# Patient Record
Sex: MALE | Race: BLACK OR AFRICAN AMERICAN | NOT HISPANIC OR LATINO | Employment: FULL TIME | URBAN - METROPOLITAN AREA
[De-identification: names, ages, dates, MRNs, and addresses within clinical notes are randomized per-mention and may not be internally consistent; named-entity substitution may affect disease eponyms.]

---

## 2017-01-25 ENCOUNTER — TELEPHONE (OUTPATIENT)
Dept: ENDOCRINOLOGY | Facility: CLINIC | Age: 47
End: 2017-01-25

## 2017-01-25 NOTE — TELEPHONE ENCOUNTER
----- Message from Chel Borrego LPN sent at 1/11/2017  1:41 PM CST -----  Contact: Self      ----- Message -----     From: Mercy Daugherty     Sent: 1/11/2017  11:03 AM       To: Krish Hernandez Staff    Pt is calling to schedule an appt for his pituitary gland.  Pt says he has been trying to schedule an appt online since November, 2016, but has been unsuccessful.   unable to schedule appt due to exhausted schedule.    He can be reached at [phone].    Thank you.

## 2017-02-14 ENCOUNTER — LAB VISIT (OUTPATIENT)
Dept: LAB | Facility: HOSPITAL | Age: 47
End: 2017-02-14
Attending: INTERNAL MEDICINE
Payer: COMMERCIAL

## 2017-02-14 ENCOUNTER — OFFICE VISIT (OUTPATIENT)
Dept: ENDOCRINOLOGY | Facility: CLINIC | Age: 47
End: 2017-02-14
Payer: COMMERCIAL

## 2017-02-14 VITALS
SYSTOLIC BLOOD PRESSURE: 138 MMHG | DIASTOLIC BLOOD PRESSURE: 84 MMHG | WEIGHT: 253.5 LBS | HEART RATE: 82 BPM | HEIGHT: 72 IN | BODY MASS INDEX: 34.34 KG/M2

## 2017-02-14 DIAGNOSIS — E03.8 SECONDARY HYPOTHYROIDISM: ICD-10-CM

## 2017-02-14 DIAGNOSIS — E66.9 OBESITY, UNSPECIFIED OBESITY SEVERITY, UNSPECIFIED OBESITY TYPE: ICD-10-CM

## 2017-02-14 DIAGNOSIS — D35.2 PITUITARY ADENOMA: Primary | ICD-10-CM

## 2017-02-14 DIAGNOSIS — D49.7 PITUITARY TUMOR: ICD-10-CM

## 2017-02-14 DIAGNOSIS — D35.2 PITUITARY ADENOMA: ICD-10-CM

## 2017-02-14 LAB — T4 FREE SERPL-MCNC: 0.99 NG/DL

## 2017-02-14 PROCEDURE — 84439 ASSAY OF FREE THYROXINE: CPT

## 2017-02-14 PROCEDURE — 99999 PR PBB SHADOW E&M-EST. PATIENT-LVL III: CPT | Mod: PBBFAC,,, | Performed by: INTERNAL MEDICINE

## 2017-02-14 PROCEDURE — 36415 COLL VENOUS BLD VENIPUNCTURE: CPT

## 2017-02-14 PROCEDURE — 99214 OFFICE O/P EST MOD 30 MIN: CPT | Mod: S$GLB,,, | Performed by: INTERNAL MEDICINE

## 2017-02-14 NOTE — PROGRESS NOTES
"Subjective:      Patient ID: Michael Santiago is a 46 y.o. male.    Chief Complaint:  No chief complaint on file.      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  He just feels "bad"  No pain  No HA  Getting erection and has good interest in sex         Mri 10/31/14  1. History of prior pituitary neoplasm resection with some heterogeneous enhancement to the right of midline as described above which could represent residual pituitary neoplasm versus postsurgical change. Clinical correlation recommended. Follow can   be obtained to document stability.     Review of Systems   Constitutional: Positive for unexpected weight change. Negative for fatigue.        Does snore   No vika eval done    HENT: Negative for voice change.    Eyes: Positive for visual disturbance (some blurry vision ).   Respiratory: Negative for shortness of breath.    Cardiovascular: Negative for palpitations.   Gastrointestinal: Negative for abdominal pain, constipation and diarrhea.   Endocrine: Negative for cold intolerance, heat intolerance and polyuria.   Genitourinary: Negative for frequency.   Musculoskeletal: Negative for myalgias.   Skin: Negative for wound.   Neurological: Negative for dizziness and headaches.   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   All other systems reviewed and are negative.      Objective:   Physical Exam   Neck: No thyromegaly present.   Cardiovascular: Normal rate.    Pulmonary/Chest: Effort normal.   Abdominal: Soft.   Musculoskeletal: He exhibits no edema.   Vitals reviewed.    Vitals:    02/14/17 1218   BP: 138/84   Pulse: 82   Height: 6' (1.829 m)         Lab Review:   Results for orders placed or performed in visit on 08/02/16   T4, free   Result Value Ref Range    Free T4 0.72 0.71 - 1.51 ng/dL        Assessment:   # pituitary adenoma - s/p surgery  Watch for anterior pituitary hormonal deficiencies over time  Alerted as to s/s  Call if any concerns    # Sec hypothyroidism      LT4 75 mcg daily   Follow FT4, awaiting today's results         # Obesity - Body mass index is 34.38 kg/(m^2).    Unable to lose weight   check FSH/LH , testosterone   - decrease carbohydrate intake yasmine 35 gms a meal three times a day   In conjunction with exercises      Follow up:  8AM testosterone, FSH,LH   In 8/2017 with MRI and blood work 1 week prior to appointment

## 2017-02-14 NOTE — PATIENT INSTRUCTIONS
- decrease carbohydrate intake yasmine 35 gms a meal three times a day   In conjunction with exercises

## 2017-02-15 ENCOUNTER — PATIENT MESSAGE (OUTPATIENT)
Dept: ENDOCRINOLOGY | Facility: CLINIC | Age: 47
End: 2017-02-15

## 2017-02-15 RX ORDER — LEVOTHYROXINE SODIUM 75 UG/1
75 TABLET ORAL DAILY
Qty: 30 TABLET | Refills: 11 | Status: SHIPPED | OUTPATIENT
Start: 2017-02-15 | End: 2018-03-08 | Stop reason: SDUPTHER

## 2017-03-24 ENCOUNTER — CLINICAL SUPPORT (OUTPATIENT)
Dept: OPHTHALMOLOGY | Facility: CLINIC | Age: 47
End: 2017-03-24
Payer: COMMERCIAL

## 2017-03-24 ENCOUNTER — OFFICE VISIT (OUTPATIENT)
Dept: OPHTHALMOLOGY | Facility: CLINIC | Age: 47
End: 2017-03-24
Payer: COMMERCIAL

## 2017-03-24 DIAGNOSIS — D35.2 BENIGN TUMOR OF PITUITARY GLAND AND CRANIOPHARYNGEAL DUCT: ICD-10-CM

## 2017-03-24 DIAGNOSIS — H53.452: ICD-10-CM

## 2017-03-24 DIAGNOSIS — H40.011 OPEN ANGLE WITH BORDERLINE FINDINGS AND LOW GLAUCOMA RISK IN RIGHT EYE: ICD-10-CM

## 2017-03-24 DIAGNOSIS — H40.022 OPEN ANGLE WITH BORDERLINE FINDINGS AND HIGH GLAUCOMA RISK IN LEFT EYE: Primary | ICD-10-CM

## 2017-03-24 DIAGNOSIS — H40.022 OPEN ANGLE WITH BORDERLINE FINDINGS AND HIGH GLAUCOMA RISK IN LEFT EYE: ICD-10-CM

## 2017-03-24 DIAGNOSIS — H52.13 MYOPIA, BILATERAL: ICD-10-CM

## 2017-03-24 DIAGNOSIS — D35.3 BENIGN TUMOR OF PITUITARY GLAND AND CRANIOPHARYNGEAL DUCT: ICD-10-CM

## 2017-03-24 DIAGNOSIS — H57.13 EYE PAIN, BILATERAL: ICD-10-CM

## 2017-03-24 PROCEDURE — 92083 EXTENDED VISUAL FIELD XM: CPT | Mod: S$GLB,,, | Performed by: OPHTHALMOLOGY

## 2017-03-24 PROCEDURE — 92250 FUNDUS PHOTOGRAPHY W/I&R: CPT | Mod: S$GLB,,, | Performed by: OPHTHALMOLOGY

## 2017-03-24 PROCEDURE — 92014 COMPRE OPH EXAM EST PT 1/>: CPT | Mod: S$GLB,,, | Performed by: OPHTHALMOLOGY

## 2017-03-24 PROCEDURE — 99999 PR PBB SHADOW E&M-EST. PATIENT-LVL II: CPT | Mod: PBBFAC,,, | Performed by: OPHTHALMOLOGY

## 2017-03-24 PROCEDURE — 92015 DETERMINE REFRACTIVE STATE: CPT | Mod: S$GLB,,, | Performed by: OPHTHALMOLOGY

## 2017-03-24 NOTE — PROGRESS NOTES
HPI     Glaucoma    Additional comments: HVF review today           Blurred Vision    Additional comments: Pt c/o intermittent blurred vision and wouldlike new   Rx for glasses           Comments   DLS: 5/19/16    1) LTG Suspect  2) Pituitary Tumor  3) VF Defect OS  4) Dot Heme OU           Last edited by Анна Langley MA on 3/24/2017 10:03 AM. (History)            Assessment /Plan     For exam results, see Encounter Report.    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 If aggravated by the latanoprost     LTG suspect - NOT on treatment   2/2 abnl VF test (INS os ) (VF test done 2/2 H/O pit tumor)  Neg FmHx     First HVF   11/2014   First photos   12/2014   Treatment / Drops started   11/2014 // stopped 2/12/2015 - 2/2 eye pain            Family history    neg        Glaucoma meds    Latanoprost started 11/2014 - 2/12/2015        H/O adverse rxn to glaucoma drops - intol to latanoprost - eye pain         LASERS    none        GLAUCOMA SURGERIES    none        OTHER EYE SURGERIES    none        CDR    0.7 / 0/75        Tbase    10-11 / 8-9          Tmax    11/9            Ttarget    ?             HVF    4 test 2014 to  2017 - nl od // INS os        Gonio    +3 ou        CCT    480/477 (thin ou)        OCT    1 test 2015 to 2015 - RNFL - nl od // bord T os  os        HRT    1 test 2015 - to 2015 -  MR -  Dec. N/I, bord S/T od // high std dev. os /// CDR 0.79 od // high std dev.  os        Disc photos    - 2014, 2017  - OIS     - Ttoday   12/13  - Test done today    HVF / DFE / photos       2. Pit tumor    S/P resection    No associated VF loss   Patient to sees a neurologist     3. VF defect  OS   INS    4. H/O Dot heme ou    Minimal - see photos 12/2014 - no H/O diabetes - resolved    PLAN  LTG suspect on basis of VF defect os and ON exam   Stay off  - latanoprost ou q hs - no real change in IOP - ?? Effect - ?? Aggravating eye pain     HVF  Has a + INS x 4 OS (small)     Will monitor closely off gtts for now - the left eye consistently has a small INS     F/U 9  months with HRT / gonio /     If need to re-start gtts can try a BB (H/O intol to latanoprost - eye pain)     I have seen and personally examined the patient.  I agree with the findings, assessment and plan of the resident and/or fellow.     Lidia Weeks MD

## 2017-03-28 ENCOUNTER — LAB VISIT (OUTPATIENT)
Dept: LAB | Facility: HOSPITAL | Age: 47
End: 2017-03-28
Attending: INTERNAL MEDICINE
Payer: COMMERCIAL

## 2017-03-28 DIAGNOSIS — D49.7 PITUITARY TUMOR: ICD-10-CM

## 2017-03-28 DIAGNOSIS — E66.9 OBESITY, UNSPECIFIED OBESITY SEVERITY, UNSPECIFIED OBESITY TYPE: ICD-10-CM

## 2017-03-28 DIAGNOSIS — D35.2 PITUITARY ADENOMA: ICD-10-CM

## 2017-03-28 LAB
CORTIS SERPL-MCNC: 9.1 UG/DL
FSH SERPL-ACNC: 1.8 MIU/ML
LH SERPL-ACNC: 2.7 MIU/ML

## 2017-03-28 PROCEDURE — 36415 COLL VENOUS BLD VENIPUNCTURE: CPT

## 2017-03-28 PROCEDURE — 82533 TOTAL CORTISOL: CPT

## 2017-03-28 PROCEDURE — 83002 ASSAY OF GONADOTROPIN (LH): CPT

## 2017-03-28 PROCEDURE — 83001 ASSAY OF GONADOTROPIN (FSH): CPT

## 2017-03-30 ENCOUNTER — PATIENT MESSAGE (OUTPATIENT)
Dept: ENDOCRINOLOGY | Facility: CLINIC | Age: 47
End: 2017-03-30

## 2017-03-31 NOTE — TELEPHONE ENCOUNTER
AM cortisol and thyroid function appropriate   We checked to make sure you are producing enough steroids    So I advise to get repeat 8AM labs. Prior to visit in 8/2017 - orders placed inn 2/14/17

## 2017-07-17 ENCOUNTER — PATIENT MESSAGE (OUTPATIENT)
Dept: ENDOCRINOLOGY | Facility: CLINIC | Age: 47
End: 2017-07-17

## 2017-07-17 DIAGNOSIS — N52.9 ERECTILE DYSFUNCTION, UNSPECIFIED ERECTILE DYSFUNCTION TYPE: Primary | ICD-10-CM

## 2017-08-06 ENCOUNTER — PATIENT MESSAGE (OUTPATIENT)
Dept: ENDOCRINOLOGY | Facility: CLINIC | Age: 47
End: 2017-08-06

## 2017-08-06 DIAGNOSIS — E78.5 DYSLIPIDEMIA: Primary | ICD-10-CM

## 2017-08-10 NOTE — TELEPHONE ENCOUNTER
I will add lipid panel   We do not do pneumonia vaccinations, please have his PCP take care of that

## 2017-08-24 ENCOUNTER — LAB VISIT (OUTPATIENT)
Dept: LAB | Facility: HOSPITAL | Age: 47
End: 2017-08-24
Attending: INTERNAL MEDICINE
Payer: COMMERCIAL

## 2017-08-24 DIAGNOSIS — N52.9 ERECTILE DYSFUNCTION, UNSPECIFIED ERECTILE DYSFUNCTION TYPE: ICD-10-CM

## 2017-08-24 DIAGNOSIS — E66.9 OBESITY, UNSPECIFIED OBESITY SEVERITY, UNSPECIFIED OBESITY TYPE: ICD-10-CM

## 2017-08-24 DIAGNOSIS — D35.2 PITUITARY ADENOMA: ICD-10-CM

## 2017-08-24 DIAGNOSIS — E78.5 DYSLIPIDEMIA: ICD-10-CM

## 2017-08-24 DIAGNOSIS — D49.7 PITUITARY TUMOR: ICD-10-CM

## 2017-08-24 LAB
ALBUMIN SERPL BCP-MCNC: 3.6 G/DL
ALP SERPL-CCNC: 89 U/L
ALT SERPL W/O P-5'-P-CCNC: 39 U/L
ANION GAP SERPL CALC-SCNC: 7 MMOL/L
AST SERPL-CCNC: 25 U/L
BILIRUB SERPL-MCNC: 0.4 MG/DL
BUN SERPL-MCNC: 13 MG/DL
CALCIUM SERPL-MCNC: 9 MG/DL
CHLORIDE SERPL-SCNC: 111 MMOL/L
CHOLEST/HDLC SERPL: 4.3 {RATIO}
CO2 SERPL-SCNC: 24 MMOL/L
CORTIS SERPL-MCNC: 4.5 UG/DL
CREAT SERPL-MCNC: 1 MG/DL
EST. GFR  (AFRICAN AMERICAN): >60 ML/MIN/1.73 M^2
EST. GFR  (NON AFRICAN AMERICAN): >60 ML/MIN/1.73 M^2
FSH SERPL-ACNC: 1.8 MIU/ML
GLUCOSE SERPL-MCNC: 105 MG/DL
HDL/CHOLESTEROL RATIO: 23.1 %
HDLC SERPL-MCNC: 134 MG/DL
HDLC SERPL-MCNC: 31 MG/DL
LDLC SERPL CALC-MCNC: 61.6 MG/DL
LH SERPL-ACNC: 2.9 MIU/ML
NONHDLC SERPL-MCNC: 103 MG/DL
POTASSIUM SERPL-SCNC: 4.3 MMOL/L
PROLACTIN SERPL IA-MCNC: 9.3 NG/ML
PROT SERPL-MCNC: 6.9 G/DL
SODIUM SERPL-SCNC: 142 MMOL/L
T4 FREE SERPL-MCNC: 0.78 NG/DL
TESTOST SERPL-MCNC: 259 NG/DL
TRIGL SERPL-MCNC: 207 MG/DL
TSH SERPL DL<=0.005 MIU/L-ACNC: 0.84 UIU/ML

## 2017-08-24 PROCEDURE — 80053 COMPREHEN METABOLIC PANEL: CPT

## 2017-08-24 PROCEDURE — 84146 ASSAY OF PROLACTIN: CPT

## 2017-08-24 PROCEDURE — 80061 LIPID PANEL: CPT

## 2017-08-24 PROCEDURE — 82533 TOTAL CORTISOL: CPT

## 2017-08-24 PROCEDURE — 84439 ASSAY OF FREE THYROXINE: CPT

## 2017-08-24 PROCEDURE — 82024 ASSAY OF ACTH: CPT

## 2017-08-24 PROCEDURE — 84403 ASSAY OF TOTAL TESTOSTERONE: CPT

## 2017-08-24 PROCEDURE — 84443 ASSAY THYROID STIM HORMONE: CPT

## 2017-08-24 PROCEDURE — 83001 ASSAY OF GONADOTROPIN (FSH): CPT

## 2017-08-24 PROCEDURE — 36415 COLL VENOUS BLD VENIPUNCTURE: CPT

## 2017-08-24 PROCEDURE — 83002 ASSAY OF GONADOTROPIN (LH): CPT

## 2017-08-25 LAB — ACTH PLAS-MCNC: 35 PG/ML

## 2017-08-29 ENCOUNTER — TELEPHONE (OUTPATIENT)
Dept: NEUROSURGERY | Facility: CLINIC | Age: 47
End: 2017-08-29

## 2017-08-29 ENCOUNTER — OFFICE VISIT (OUTPATIENT)
Dept: ENDOCRINOLOGY | Facility: CLINIC | Age: 47
End: 2017-08-29
Payer: COMMERCIAL

## 2017-08-29 VITALS
DIASTOLIC BLOOD PRESSURE: 85 MMHG | HEIGHT: 72 IN | BODY MASS INDEX: 33.89 KG/M2 | WEIGHT: 250.25 LBS | HEART RATE: 86 BPM | SYSTOLIC BLOOD PRESSURE: 125 MMHG

## 2017-08-29 DIAGNOSIS — E03.8 SECONDARY HYPOTHYROIDISM: ICD-10-CM

## 2017-08-29 DIAGNOSIS — N52.9 ERECTILE DYSFUNCTION, UNSPECIFIED ERECTILE DYSFUNCTION TYPE: ICD-10-CM

## 2017-08-29 DIAGNOSIS — D35.2 PITUITARY ADENOMA: Primary | ICD-10-CM

## 2017-08-29 PROCEDURE — 3008F BODY MASS INDEX DOCD: CPT | Mod: S$GLB,,, | Performed by: INTERNAL MEDICINE

## 2017-08-29 PROCEDURE — 99999 PR PBB SHADOW E&M-EST. PATIENT-LVL III: CPT | Mod: PBBFAC,,, | Performed by: INTERNAL MEDICINE

## 2017-08-29 PROCEDURE — 99214 OFFICE O/P EST MOD 30 MIN: CPT | Mod: S$GLB,,, | Performed by: INTERNAL MEDICINE

## 2017-08-29 NOTE — PROGRESS NOTES
Subjective:      Patient ID: Michael Santiago is a 47 y.o. male.    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          Mri 10/31/14  1. History of prior pituitary neoplasm resection with some heterogeneous enhancement to the right of midline as described above which could represent residual pituitary neoplasm versus postsurgical change. Clinical correlation recommended. Follow can   be obtained to document stability.     Review of Systems   Constitutional: Negative for fatigue and unexpected weight change.        Does snore   No vika eval done    HENT: Negative for voice change.    Eyes: Positive for visual disturbance (some blurry vision , stable ).   Respiratory: Negative for shortness of breath.    Cardiovascular: Negative for palpitations.   Gastrointestinal: Negative for abdominal pain, constipation and diarrhea.   Endocrine: Negative for cold intolerance, heat intolerance and polyuria.   Genitourinary: Negative for frequency.   Musculoskeletal: Positive for arthralgias (left toe pain ). Negative for myalgias.   Skin: Negative for wound.   Neurological: Negative for dizziness and headaches.   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   All other systems reviewed and are negative.      Objective:   Physical Exam   Neck: No thyromegaly present.   Cardiovascular: Normal rate.    Pulmonary/Chest: Effort normal.   Abdominal: Soft.   Musculoskeletal: He exhibits no edema.   Vitals reviewed.    Vitals:    08/29/17 1137   BP: 125/85   Pulse: 86   Weight: 113.5  kg (250 lb 3.6 oz)   Height: 6' (1.829 m)         Lab Review:   Results for orders placed or performed in visit on 08/24/17   Prolactin   Result Value Ref Range    Prolactin 9.3 3.5 - 19.4 ng/mL   TSH   Result Value Ref Range    TSH 0.843 0.400 - 4.000 uIU/mL   T4, free   Result Value Ref Range    Free T4 0.78 0.71 - 1.51 ng/dL   Comprehensive metabolic panel   Result Value Ref Range    Sodium 142 136 - 145 mmol/L    Potassium 4.3 3.5 - 5.1 mmol/L    Chloride 111 (H) 95 - 110 mmol/L    CO2 24 23 - 29 mmol/L    Glucose 105 70 - 110 mg/dL    BUN, Bld 13 6 - 20 mg/dL    Creatinine 1.0 0.5 - 1.4 mg/dL    Calcium 9.0 8.7 - 10.5 mg/dL    Total Protein 6.9 6.0 - 8.4 g/dL    Albumin 3.6 3.5 - 5.2 g/dL    Total Bilirubin 0.4 0.1 - 1.0 mg/dL    Alkaline Phosphatase 89 55 - 135 U/L    AST 25 10 - 40 U/L    ALT 39 10 - 44 U/L    Anion Gap 7 (L) 8 - 16 mmol/L    eGFR if African American >60 >60 mL/min/1.73 m^2    eGFR if non African American >60 >60 mL/min/1.73 m^2   Luteinizing hormone   Result Value Ref Range    LH 2.9 0.6 - 12.1 mIU/mL   Follicle stimulating hormone   Result Value Ref Range    FSH 1.80 0.95 - 11.95 mIU/mL   Testosterone   Result Value Ref Range    Testosterone, Total 259 195.0 - 1138.0 ng/dL   ACTH   Result Value Ref Range    ACTH 35 0 - 46 pg/mL   Cortisol, 8AM   Result Value Ref Range    Cortisol -8 AM 4.5 4.30 - 22.40 ug/dL   Lipid panel   Result Value Ref Range    Cholesterol 134 120 - 199 mg/dL    Triglycerides 207 (H) 30 - 150 mg/dL    HDL 31 (L) 40 - 75 mg/dL    LDL Cholesterol 61.6 (L) 63.0 - 159.0 mg/dL    HDL/Chol Ratio 23.1 20.0 - 50.0 %    Total Cholesterol/HDL Ratio 4.3 2.0 - 5.0    Non-HDL Cholesterol 103 mg/dL        Assessment:   # pituitary adenoma - s/p surgery  Watch for anterior pituitary hormonal deficiencies over time  Alerted as to s/s  Call if any concerns    # Sec hypothyroidism      LT4 75 mcg daily   Follow FT4    # ED   Testosterone appropriate   We will get testosterone panel      # gout   Please get an appointment with urgent care      Follow up:  8AM testosterone panel - whatever day works for him   RTC in 6 months with me and  with MRI and blood work 1 week prior to appointment

## 2017-10-05 ENCOUNTER — LAB VISIT (OUTPATIENT)
Dept: LAB | Facility: HOSPITAL | Age: 47
End: 2017-10-05
Attending: INTERNAL MEDICINE
Payer: COMMERCIAL

## 2017-10-05 DIAGNOSIS — E03.8 SECONDARY HYPOTHYROIDISM: ICD-10-CM

## 2017-10-05 DIAGNOSIS — N52.9 ERECTILE DYSFUNCTION, UNSPECIFIED ERECTILE DYSFUNCTION TYPE: ICD-10-CM

## 2017-10-05 DIAGNOSIS — D35.2 PITUITARY ADENOMA: ICD-10-CM

## 2017-10-05 PROCEDURE — 36415 COLL VENOUS BLD VENIPUNCTURE: CPT

## 2017-10-05 PROCEDURE — 84270 ASSAY OF SEX HORMONE GLOBUL: CPT

## 2017-10-10 LAB
ALBUMIN SERPL-MCNC: 4.5 G/DL (ref 3.6–5.1)
SHBG SERPL-SCNC: 23 NMOL/L (ref 10–50)
TESTOST FREE SERPL-MCNC: 64.5 PG/ML (ref 46–224)
TESTOST SERPL-MCNC: 375 NG/DL (ref 250–1100)
TESTOSTERONE.FREE+WB SERPL-MCNC: 132.7 NG/DL (ref 110–575)

## 2017-12-22 ENCOUNTER — CLINICAL SUPPORT (OUTPATIENT)
Dept: OPHTHALMOLOGY | Facility: CLINIC | Age: 47
End: 2017-12-22
Payer: COMMERCIAL

## 2017-12-22 ENCOUNTER — OFFICE VISIT (OUTPATIENT)
Dept: OPHTHALMOLOGY | Facility: CLINIC | Age: 47
End: 2017-12-22
Payer: COMMERCIAL

## 2017-12-22 DIAGNOSIS — H40.011 OPEN ANGLE WITH BORDERLINE FINDINGS AND LOW GLAUCOMA RISK IN RIGHT EYE: ICD-10-CM

## 2017-12-22 DIAGNOSIS — J30.9 ALLERGIC SINUSITIS: ICD-10-CM

## 2017-12-22 DIAGNOSIS — H53.452: ICD-10-CM

## 2017-12-22 DIAGNOSIS — D35.2 BENIGN TUMOR OF PITUITARY GLAND AND CRANIOPHARYNGEAL DUCT: ICD-10-CM

## 2017-12-22 DIAGNOSIS — D35.3 BENIGN TUMOR OF PITUITARY GLAND AND CRANIOPHARYNGEAL DUCT: ICD-10-CM

## 2017-12-22 DIAGNOSIS — H57.13 EYE PAIN, BILATERAL: ICD-10-CM

## 2017-12-22 DIAGNOSIS — H40.022 OPEN ANGLE WITH BORDERLINE FINDINGS AND HIGH GLAUCOMA RISK IN LEFT EYE: ICD-10-CM

## 2017-12-22 DIAGNOSIS — H52.13 MYOPIA, BILATERAL: ICD-10-CM

## 2017-12-22 DIAGNOSIS — H40.022 OPEN ANGLE WITH BORDERLINE FINDINGS AND HIGH GLAUCOMA RISK IN LEFT EYE: Primary | ICD-10-CM

## 2017-12-22 PROCEDURE — 92012 INTRM OPH EXAM EST PATIENT: CPT | Mod: S$GLB,,, | Performed by: OPHTHALMOLOGY

## 2017-12-22 PROCEDURE — 99999 PR PBB SHADOW E&M-EST. PATIENT-LVL II: CPT | Mod: PBBFAC,,, | Performed by: OPHTHALMOLOGY

## 2017-12-22 PROCEDURE — 92134 CPTRZ OPH DX IMG PST SGM RTA: CPT | Mod: S$GLB,,, | Performed by: OPHTHALMOLOGY

## 2017-12-22 PROCEDURE — 92015 DETERMINE REFRACTIVE STATE: CPT | Mod: S$GLB,,, | Performed by: OPHTHALMOLOGY

## 2017-12-22 PROCEDURE — 92020 GONIOSCOPY: CPT | Mod: S$GLB,,, | Performed by: OPHTHALMOLOGY

## 2017-12-22 NOTE — PROGRESS NOTES
HPI     DLS: 3/24/17    Pt here for HRT review;  Pt states at times he feels like he has trash in his eyes when this   happens he uses AT's and seems to help some.     Meds: No GTTS    1. Normal tension glaucoma of both eyes, moderate stage   2. 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 Retinal dot hemorrhage, bilateral        Last edited by Laura Mireles on 12/22/2017 10:15 AM. (History)            Assessment /Plan     For exam results, see Encounter Report.    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 If aggravated by the latanoprost     LTG suspect - NOT on treatment   2/2 abnl VF test (INS os ) (VF test done 2/2 H/O pit tumor)  Neg FmHx     First HVF   11/2014   First photos   12/2014   Treatment / Drops started   11/2014 // stopped 2/12/2015 - 2/2 eye pain            Family history    neg        Glaucoma meds    Latanoprost started 11/2014 - 2/12/2015        H/O adverse rxn to glaucoma drops - intol to latanoprost - eye pain         LASERS    none        GLAUCOMA SURGERIES    none        OTHER EYE SURGERIES    none        CDR    0.7 / 0/75        Tbase    10-11 / 8-9          Tmax    11/9            Ttarget    ?             HVF    4 test 2014 to  2017 - nl od // INS os        Gonio    +3 ou        CCT    480/477 (thin ou)        OCT    1 test 2015 to 2015 - RNFL - nl od // bord T os  os        HRT    2 test 2015 - to 2017 - MR -  Dec. Dec. N/I, bord S/T od // dec. S os /// CDR 0.8 od // 0.7 os        Disc photos    - 2014, 2017  - OIS     - Ttoday   12/12  - Test done today    HRT / gonio / MR       2. Pit  tumor    S/P resection    No associated VF loss   Patient to sees a neurologist     3. VF defect OS   INS    4. H/O Dot heme ou    Minimal - see photos 12/2014 - no H/O diabetes - resolved    PLAN  LTG suspect on basis of VF defect os and ON exam   Stay off  - latanoprost ou q hs - no real change in IOP - ?? Effect - ?? Aggravating eye pain     HVF  Has a + INS x 4 OS (small)     Will monitor closely off gtts for now - the left eye consistently has a small INS     F/U 6  months with HVF / DFE / OCT     Rx glasses 12/2017 - gets a free pair every year     If need to re-start gtts can try a BB (H/O intol to latanoprost - eye pain)     I have seen and personally examined the patient.  I agree with the findings, assessment and plan of the resident and/or fellow.     Lidia Weeks MD

## 2018-03-06 NOTE — PROGRESS NOTES
Subjective:      Patient ID: Michael Santiago is a 47 y.o. male.    Chief Complaint:  Follow-up    History of Present Illness  Michael Santiago presents today for follow up of pituitary adenoma. Previous patient of Dr. Rutherford. Last seen in 08/2017. This is his first visit with me.     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 Villela.    Mri 10/31/14  1. History of prior pituitary neoplasm resection with some heterogeneous enhancement to the right of midline as described above which could represent residual pituitary neoplasm versus postsurgical change. Clinical correlation recommended. Follow can   be obtained to document stability.    Per Dr. Villela note seeing every 2 years: history of pituitary adenoma s/p resection who presents today for 1 year follow up with new MRI. The patient is two years s/p resection. MRI every 2 years.     Peripheral vision intact. Follows with optho every 6 months.   Currently pt reports no diplopia or chronic headaches.    Weight gain of ~ 2 lbs since last visit      No early morning erections  Decrease libido, still having erections though  3 kids   23 years old is the youngest one     Denies breast tenderness, gynecomastia, or nipple discharge  Denies headaches, denies loss of vision    Hypothyroidism:  On 75 mcg daily    Takes first thing in the morning 30 minutes prior to meal    current symptoms:   Denies weight gain  Denies Fatigue   Denies Constipation  Denies Hair loss  + Brittle nails  Denies Mental fog   No heat or cold intolerance.      Review of Systems   Constitutional: Negative for unexpected weight change.   Eyes: Negative for visual disturbance.   Respiratory: Negative for shortness of breath.    Cardiovascular: Negative for chest pain.   Gastrointestinal: Negative for abdominal pain.   Endocrine: Negative  for cold intolerance, heat intolerance, polydipsia, polyphagia and polyuria.   Musculoskeletal: Negative for arthralgias.   Skin: Negative for wound.   Neurological: Negative for headaches.   Hematological: Does not bruise/bleed easily.   Psychiatric/Behavioral: Negative for sleep disturbance.     Objective:   Physical Exam   Neck: No thyromegaly present.   Cardiovascular: Normal rate.    No edema present   Pulmonary/Chest: Effort normal.   Abdominal: Soft.   Vitals reviewed.    Body mass index is 34.27 kg/m².    Lab Review:   Lab Results   Component Value Date    HGBA1C 5.7 12/04/2014     Lab Results   Component Value Date    CHOL 134 08/24/2017    HDL 31 (L) 08/24/2017    LDLCALC 61.6 (L) 08/24/2017    TRIG 207 (H) 08/24/2017    CHOLHDL 23.1 08/24/2017     Lab Results   Component Value Date     08/24/2017    K 4.3 08/24/2017     (H) 08/24/2017    CO2 24 08/24/2017     08/24/2017    BUN 13 08/24/2017    CREATININE 1.0 08/24/2017    CALCIUM 9.0 08/24/2017    PROT 6.9 08/24/2017    ALBUMIN 4.5 10/05/2017    BILITOT 0.4 08/24/2017    ALKPHOS 89 08/24/2017    AST 25 08/24/2017    ALT 39 08/24/2017    ANIONGAP 7 (L) 08/24/2017    ESTGFRAFRICA >60 08/24/2017    EGFRNONAA >60 08/24/2017    TSH 0.843 08/24/2017       Assessment and Plan     1. Secondary hypothyroidism  Comprehensive metabolic panel    Prolactin    Follicle stimulating hormone    Luteinizing hormone    Hemoglobin A1c    TSH    Testosterone Panel    ACTH    Cortisol, 8AM    Lipid panel    T4, free    Comprehensive metabolic panel    ACTH    Cortisol, 8AM    Follicle stimulating hormone    Luteinizing hormone    Hemoglobin A1c    Lipid panel    TSH    Prolactin    T4, free    Testosterone Panel   2. Pituitary adenoma  Comprehensive metabolic panel    Prolactin    Follicle stimulating hormone    Luteinizing hormone    Hemoglobin A1c    TSH    Testosterone Panel    ACTH    Cortisol, 8AM    Lipid panel    Comprehensive metabolic panel    ACTH     Cortisol, 8AM    Follicle stimulating hormone    Luteinizing hormone    Hemoglobin A1c    Lipid panel    TSH    Prolactin    T4, free    Testosterone Panel     pituitary adenoma - s/p surgery  Watch for anterior pituitary hormonal deficiencies over time  Alerted as to s/s  Call if any concerns  Continue following with Dr. Villela as well.      Secondary Hypothyroidism  -- Clinically and biochemically euthyroid  -- Goal is a normal Free t4 in his case  -- Check TFTs today and adjust dosage accordingly   -- Pathophysiology of pituitary pertaining to thyroid function explained in detail  -- Avoid exogenous hyperthyroidism as this can accelerate bone loss and increase risk of CV complications.  -- Advised to take LT4 on an empty stomach with water and to wait 30-45 minutes before eating or taking other medications   -- Reviewed that symptoms of hypothyroidism may not correlate with tsh, and a normal TSH is the goal of therapy.....  symptoms are not a justification for over treatment      Follow-up in about 6 months (around 9/8/2018).  With labs prior

## 2018-03-08 ENCOUNTER — OFFICE VISIT (OUTPATIENT)
Dept: ENDOCRINOLOGY | Facility: CLINIC | Age: 48
End: 2018-03-08
Payer: COMMERCIAL

## 2018-03-08 VITALS
SYSTOLIC BLOOD PRESSURE: 128 MMHG | HEIGHT: 72 IN | HEART RATE: 78 BPM | DIASTOLIC BLOOD PRESSURE: 84 MMHG | BODY MASS INDEX: 34.22 KG/M2 | WEIGHT: 252.63 LBS

## 2018-03-08 DIAGNOSIS — D35.2 PITUITARY ADENOMA: ICD-10-CM

## 2018-03-08 DIAGNOSIS — E03.8 SECONDARY HYPOTHYROIDISM: Primary | ICD-10-CM

## 2018-03-08 PROCEDURE — 99214 OFFICE O/P EST MOD 30 MIN: CPT | Mod: S$GLB,,, | Performed by: NURSE PRACTITIONER

## 2018-03-08 PROCEDURE — 99999 PR PBB SHADOW E&M-EST. PATIENT-LVL III: CPT | Mod: PBBFAC,,, | Performed by: NURSE PRACTITIONER

## 2018-03-08 RX ORDER — LEVOTHYROXINE SODIUM 75 UG/1
75 TABLET ORAL DAILY
Qty: 90 TABLET | Refills: 3 | Status: SHIPPED | OUTPATIENT
Start: 2018-03-08 | End: 2019-02-01 | Stop reason: SDUPTHER

## 2018-03-08 RX ORDER — ALLOPURINOL 300 MG/1
300 TABLET ORAL DAILY
Qty: 90 TABLET | Refills: 3 | Status: SHIPPED | OUTPATIENT
Start: 2018-03-08 | End: 2019-03-06 | Stop reason: SDUPTHER

## 2018-03-12 ENCOUNTER — PATIENT MESSAGE (OUTPATIENT)
Dept: ENDOCRINOLOGY | Facility: CLINIC | Age: 48
End: 2018-03-12

## 2018-03-22 ENCOUNTER — LAB VISIT (OUTPATIENT)
Dept: LAB | Facility: HOSPITAL | Age: 48
End: 2018-03-22
Attending: NURSE PRACTITIONER
Payer: COMMERCIAL

## 2018-03-22 DIAGNOSIS — E03.8 SECONDARY HYPOTHYROIDISM: ICD-10-CM

## 2018-03-22 DIAGNOSIS — D35.2 PITUITARY ADENOMA: ICD-10-CM

## 2018-03-22 LAB
ALBUMIN SERPL BCP-MCNC: 4.2 G/DL
ALP SERPL-CCNC: 84 U/L
ALT SERPL W/O P-5'-P-CCNC: 36 U/L
ANION GAP SERPL CALC-SCNC: 7 MMOL/L
AST SERPL-CCNC: 25 U/L
BILIRUB SERPL-MCNC: 0.6 MG/DL
BUN SERPL-MCNC: 12 MG/DL
CALCIUM SERPL-MCNC: 9.4 MG/DL
CHLORIDE SERPL-SCNC: 105 MMOL/L
CHOLEST SERPL-MCNC: 131 MG/DL
CHOLEST/HDLC SERPL: 4.4 {RATIO}
CO2 SERPL-SCNC: 26 MMOL/L
CREAT SERPL-MCNC: 1.1 MG/DL
EST. GFR  (AFRICAN AMERICAN): >60 ML/MIN/1.73 M^2
EST. GFR  (NON AFRICAN AMERICAN): >60 ML/MIN/1.73 M^2
GLUCOSE SERPL-MCNC: 97 MG/DL
HDLC SERPL-MCNC: 30 MG/DL
HDLC SERPL: 22.9 %
LDLC SERPL CALC-MCNC: 66.4 MG/DL
NONHDLC SERPL-MCNC: 101 MG/DL
POTASSIUM SERPL-SCNC: 4.3 MMOL/L
PROT SERPL-MCNC: 7.5 G/DL
SODIUM SERPL-SCNC: 138 MMOL/L
T4 FREE SERPL-MCNC: 1 NG/DL
TRIGL SERPL-MCNC: 173 MG/DL
TSH SERPL DL<=0.005 MIU/L-ACNC: 0.82 UIU/ML

## 2018-03-22 PROCEDURE — 82024 ASSAY OF ACTH: CPT

## 2018-03-22 PROCEDURE — 83036 HEMOGLOBIN GLYCOSYLATED A1C: CPT

## 2018-03-22 PROCEDURE — 82533 TOTAL CORTISOL: CPT

## 2018-03-22 PROCEDURE — 84146 ASSAY OF PROLACTIN: CPT

## 2018-03-22 PROCEDURE — 82040 ASSAY OF SERUM ALBUMIN: CPT

## 2018-03-22 PROCEDURE — 36415 COLL VENOUS BLD VENIPUNCTURE: CPT

## 2018-03-22 PROCEDURE — 83001 ASSAY OF GONADOTROPIN (FSH): CPT

## 2018-03-22 PROCEDURE — 84443 ASSAY THYROID STIM HORMONE: CPT

## 2018-03-22 PROCEDURE — 84439 ASSAY OF FREE THYROXINE: CPT

## 2018-03-22 PROCEDURE — 80053 COMPREHEN METABOLIC PANEL: CPT

## 2018-03-22 PROCEDURE — 80061 LIPID PANEL: CPT

## 2018-03-22 PROCEDURE — 83002 ASSAY OF GONADOTROPIN (LH): CPT

## 2018-03-23 LAB
ACTH PLAS-MCNC: 38 PG/ML
CORTIS SERPL-MCNC: 5.6 UG/DL
ESTIMATED AVG GLUCOSE: 108 MG/DL
FSH SERPL-ACNC: 1.8 MIU/ML
HBA1C MFR BLD HPLC: 5.4 %
LH SERPL-ACNC: 2.5 MIU/ML
PROLACTIN SERPL IA-MCNC: 8.4 NG/ML

## 2018-03-28 LAB
ALBUMIN SERPL-MCNC: 4.7 G/DL (ref 3.6–5.1)
SHBG SERPL-SCNC: 19 NMOL/L (ref 10–50)
TESTOST FREE SERPL-MCNC: 50.8 PG/ML (ref 46–224)
TESTOST SERPL-MCNC: 273 NG/DL (ref 250–1100)
TESTOSTERONE.FREE+WB SERPL-MCNC: 108.8 NG/DL (ref 110–575)

## 2018-07-24 ENCOUNTER — PATIENT MESSAGE (OUTPATIENT)
Dept: NEUROSURGERY | Facility: CLINIC | Age: 48
End: 2018-07-24

## 2018-07-24 ENCOUNTER — TELEPHONE (OUTPATIENT)
Dept: NEUROSURGERY | Facility: CLINIC | Age: 48
End: 2018-07-24

## 2018-07-24 DIAGNOSIS — Z86.018 HISTORY OF PITUITARY ADENOMA: Primary | ICD-10-CM

## 2018-07-24 DIAGNOSIS — E89.3 STATUS POST TRANSSPHENOIDAL PITUITARY RESECTION: ICD-10-CM

## 2018-08-28 ENCOUNTER — HOSPITAL ENCOUNTER (OUTPATIENT)
Dept: RADIOLOGY | Facility: HOSPITAL | Age: 48
Discharge: HOME | End: 2018-08-28
Attending: NEUROLOGICAL SURGERY
Payer: COMMERCIAL

## 2018-08-28 ENCOUNTER — OFFICE VISIT (OUTPATIENT)
Dept: NEUROSURGERY | Facility: CLINIC | Age: 48
End: 2018-08-28
Payer: COMMERCIAL

## 2018-08-28 VITALS
HEART RATE: 82 BPM | TEMPERATURE: 98 F | WEIGHT: 242.75 LBS | DIASTOLIC BLOOD PRESSURE: 75 MMHG | SYSTOLIC BLOOD PRESSURE: 129 MMHG | BODY MASS INDEX: 32.92 KG/M2

## 2018-08-28 DIAGNOSIS — D49.7 PITUITARY TUMOR: ICD-10-CM

## 2018-08-28 DIAGNOSIS — Z86.018 HISTORY OF PITUITARY ADENOMA: ICD-10-CM

## 2018-08-28 DIAGNOSIS — E89.3 STATUS POST TRANSSPHENOIDAL PITUITARY RESECTION: ICD-10-CM

## 2018-08-28 DIAGNOSIS — Z86.018 HISTORY OF PITUITARY ADENOMA: Primary | ICD-10-CM

## 2018-08-28 PROCEDURE — 70553 MRI BRAIN STEM W/O & W/DYE: CPT | Mod: 26,,, | Performed by: RADIOLOGY

## 2018-08-28 PROCEDURE — 99214 OFFICE O/P EST MOD 30 MIN: CPT | Mod: S$GLB,,, | Performed by: NEUROLOGICAL SURGERY

## 2018-08-28 PROCEDURE — 25500020 PHARM REV CODE 255: Performed by: NEUROLOGICAL SURGERY

## 2018-08-28 PROCEDURE — 3008F BODY MASS INDEX DOCD: CPT | Mod: CPTII,S$GLB,, | Performed by: NEUROLOGICAL SURGERY

## 2018-08-28 PROCEDURE — A9585 GADOBUTROL INJECTION: HCPCS | Performed by: NEUROLOGICAL SURGERY

## 2018-08-28 PROCEDURE — 70553 MRI BRAIN STEM W/O & W/DYE: CPT | Mod: TC

## 2018-08-28 PROCEDURE — 99999 PR PBB SHADOW E&M-EST. PATIENT-LVL III: CPT | Mod: PBBFAC,,, | Performed by: NEUROLOGICAL SURGERY

## 2018-08-28 RX ORDER — GADOBUTROL 604.72 MG/ML
5 INJECTION INTRAVENOUS
Status: COMPLETED | OUTPATIENT
Start: 2018-08-28 | End: 2018-08-28

## 2018-08-28 RX ADMIN — GADOBUTROL 5 ML: 604.72 INJECTION INTRAVENOUS at 11:08

## 2018-08-28 NOTE — PATIENT INSTRUCTIONS
I have personally reviewed the MRI brain  with the pt which shows a hypoenhancing mass in the right side of the pituitary gland with mild mass effect when compared to MRI brain from 08/02/2016, and no evidence of compression.    I will schedule the patient for a 2 year follow up with MRI brain W WO Contrast

## 2018-08-28 NOTE — PROGRESS NOTES
Subjective:   I, Florence Young, attest that this documentation has been prepared under the direction and in the presence of Charlie Villela MD.     Patient ID: Michael Santiago is a 48 y.o. male     Chief Complaint: Follow-up      HPI  The patient is a 48 y.o. male with pituitary adenoma, s/p transphenoidal hypophysectomy of tumor on 06/30/2014, who presents today for 2 year follow up with MRI brain. I last saw the pt on 08/02/2016, at which time he was essentially asymptomatic and MRI brain (dated 08/02/2016) showed a stable small focus of hypoenhancing tissue at site of large pituitary macroadenoma resection, and a normal gland seen without evidence of any residual tumor.  Today, the pt states he is doing well with no complaints at this time. He continues to work with no difficulty or changes in his energy levels..    Review of Systems   Constitutional: Negative for activity change, appetite change, fatigue, fever and unexpected weight change.   HENT: Negative for facial swelling.    Eyes: Negative.    Respiratory: Negative.    Cardiovascular: Negative.    Gastrointestinal: Negative for diarrhea, nausea and vomiting.   Endocrine: Negative.    Genitourinary: Negative.    Musculoskeletal: Negative for back pain, joint swelling, myalgias and neck pain.   Neurological: Negative for dizziness, weakness, numbness and headaches.   Psychiatric/Behavioral: Negative.       Past Medical History:   Diagnosis Date    Gout     Headache(784.0)     Hypothyroidism     Normal tension glaucoma of both eyes 11/18/2014    Obesity 7/29/2014    Pituitary adenoma        Objective:      Vitals:    08/28/18 1255   BP: 129/75   Pulse: 82   Temp: 98 °F (36.7 °C)      Physical Exam   Constitutional: He is oriented to person, place, and time. He appears well-nourished.   HENT:   Head: Normocephalic and atraumatic.   Neck: Neck supple.   Neurological: He is alert and oriented to person, place, and time. No cranial nerve deficit. He displays a  negative Romberg sign. GCS eye subscore is 4. GCS verbal subscore is 5. GCS motor subscore is 6.       IMAGING:  MRI Brain W WO Contrast (08/28/2018) shows a hypoenhancing mass in the right side of the pituitary gland with mild mass effect when compared to MRI brain from 08/02/2016,  but no evidence of compression.    I have personally reviewed the images with the pt.      I, Dr. Charlie Villela, personally performed the services described in this documentation. All medical record entries made by the scribe, Florence Young, were at my direction and in my presence.  I have reviewed the chart and agree that the record reflects my personal performance and is accurate and complete. Charlie Villela MD. 08/28/2018    Assessment:       Pituitary tumor.     Plan:   I have personally reviewed the MRI brain  with the pt which shows a hypoenhancing mass in the right side of the pituitary gland with mild mass effect when compared to MRI brain from 08/02/2016, and no evidence of compression.    I will schedule the patient for a 2 year follow up with MRI brain W WO Contrast

## 2018-08-31 ENCOUNTER — CLINICAL SUPPORT (OUTPATIENT)
Dept: OPHTHALMOLOGY | Facility: CLINIC | Age: 48
End: 2018-08-31
Attending: OPHTHALMOLOGY
Payer: COMMERCIAL

## 2018-08-31 DIAGNOSIS — D35.3 BENIGN TUMOR OF PITUITARY GLAND AND CRANIOPHARYNGEAL DUCT: ICD-10-CM

## 2018-08-31 DIAGNOSIS — D35.2 BENIGN TUMOR OF PITUITARY GLAND AND CRANIOPHARYNGEAL DUCT: ICD-10-CM

## 2018-08-31 DIAGNOSIS — H52.13 MYOPIA, BILATERAL: ICD-10-CM

## 2018-08-31 DIAGNOSIS — H40.011 OPEN ANGLE WITH BORDERLINE FINDINGS AND LOW GLAUCOMA RISK IN RIGHT EYE: ICD-10-CM

## 2018-08-31 DIAGNOSIS — H53.452: ICD-10-CM

## 2018-08-31 DIAGNOSIS — H40.022 OPEN ANGLE WITH BORDERLINE FINDINGS AND HIGH GLAUCOMA RISK IN LEFT EYE: Primary | ICD-10-CM

## 2018-08-31 DIAGNOSIS — H40.022 OPEN ANGLE WITH BORDERLINE FINDINGS AND HIGH GLAUCOMA RISK IN LEFT EYE: ICD-10-CM

## 2018-08-31 PROCEDURE — 99999 PR PBB SHADOW E&M-EST. PATIENT-LVL II: CPT | Mod: PBBFAC,,, | Performed by: OPHTHALMOLOGY

## 2018-08-31 PROCEDURE — 92133 CPTRZD OPH DX IMG PST SGM ON: CPT | Mod: S$GLB,,, | Performed by: OPHTHALMOLOGY

## 2018-08-31 PROCEDURE — 92083 EXTENDED VISUAL FIELD XM: CPT | Mod: S$GLB,,, | Performed by: OPHTHALMOLOGY

## 2018-08-31 PROCEDURE — 92014 COMPRE OPH EXAM EST PT 1/>: CPT | Mod: S$GLB,,, | Performed by: OPHTHALMOLOGY

## 2018-08-31 NOTE — PROGRESS NOTES
HPI     Glaucoma      Additional comments: HVF and OCT review today              Comments     DLS: 5/19/16    1) LTG Suspect  2) Pituitary Tumor  3) VF Defect OS  4) Dot Heme OU              Last edited by Анна Langley MA on 8/31/2018 11:39 AM. (History)            Assessment /Plan     For exam results, see Encounter Report.    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 If aggravated by the latanoprost     LTG suspect - NOT on treatment   2/2 abnl VF test (INS os ) (VF test done 2/2 H/O pit tumor)  Neg FmHx     First HVF   11/2014   First photos   12/2014   Treatment / Drops started   11/2014 // stopped 2/12/2015 - 2/2 eye pain            Family history    neg        Glaucoma meds    Latanoprost started 11/2014 - 2/12/2015        H/O adverse rxn to glaucoma drops - intol to latanoprost - eye pain         LASERS    none        GLAUCOMA SURGERIES    none        OTHER EYE SURGERIES    none        CDR    0.7 / 0/75        Tbase    10-11 / 8-9          Tmax    11/9            Ttarget    ?             HVF    5 test 2014 to  2017 - nl od // INS os        Gonio    +3 ou        CCT    480/477 (thin ou)        OCT    2 test 2015 to 2018 - RNFL - dec T od // dec. G/TS/T/TI  os        HRT    2 test 2015 - to 2017 - MR -  Dec. Dec. N/I, bord S/T od // dec. S os /// CDR 0.8 od // 0.7 os        Disc photos    - 2014, 2017  - OIS     - Ttoday   10/9  - Test done today    HVF / DFE / OCT       2. Pit tumor    S/P resection    No associated  VF loss   Patient to sees a neurologist     3. VF defect OS   INS    4. H/O Dot heme ou    Minimal - see photos 12/2014 - no H/O diabetes - resolved    PLAN  LTG suspect on basis of VF defect os and ON exam   Stay off  - latanoprost ou q hs - no real change in IOP - ?? Effect - ?? Aggravating eye pain     HVF  Has a + INS x 4 OS (small)     Will monitor closely off gtts for now - the left eye consistently has a small INS     F/U 6  months with HRT / gonio     Rx glasses 8/2018 - gets a free pair every year     If need to re-start gtts can try a BB (H/O intol to latanoprost - eye pain)     I have seen and personally examined the patient.  I agree with the findings, assessment and plan of the resident and/or fellow.     Lidia Weeks MD

## 2019-02-01 RX ORDER — LEVOTHYROXINE SODIUM 75 UG/1
TABLET ORAL
Qty: 90 TABLET | Refills: 3 | Status: SHIPPED | OUTPATIENT
Start: 2019-02-01 | End: 2019-03-05 | Stop reason: SDUPTHER

## 2019-03-05 ENCOUNTER — PATIENT MESSAGE (OUTPATIENT)
Dept: ENDOCRINOLOGY | Facility: CLINIC | Age: 49
End: 2019-03-05

## 2019-03-06 RX ORDER — LEVOTHYROXINE SODIUM 75 UG/1
75 TABLET ORAL DAILY
Qty: 90 TABLET | Refills: 3 | Status: SHIPPED | OUTPATIENT
Start: 2019-03-06 | End: 2019-03-06 | Stop reason: SDUPTHER

## 2019-03-07 RX ORDER — LEVOTHYROXINE SODIUM 75 UG/1
75 TABLET ORAL DAILY
Qty: 90 TABLET | Refills: 3 | Status: SHIPPED | OUTPATIENT
Start: 2019-03-07 | End: 2020-03-20 | Stop reason: SDUPTHER

## 2019-03-07 RX ORDER — ALLOPURINOL 300 MG/1
300 TABLET ORAL DAILY
Qty: 90 TABLET | Refills: 3 | Status: SHIPPED | OUTPATIENT
Start: 2019-03-07 | End: 2020-03-20 | Stop reason: SDUPTHER

## 2020-03-20 ENCOUNTER — PATIENT MESSAGE (OUTPATIENT)
Dept: ENDOCRINOLOGY | Facility: CLINIC | Age: 50
End: 2020-03-20

## 2020-03-20 RX ORDER — ALLOPURINOL 300 MG/1
300 TABLET ORAL DAILY
Qty: 90 TABLET | Refills: 0 | Status: SHIPPED | OUTPATIENT
Start: 2020-03-20 | End: 2020-06-20 | Stop reason: SDUPTHER

## 2020-03-20 RX ORDER — LEVOTHYROXINE SODIUM 75 UG/1
75 TABLET ORAL DAILY
Qty: 90 TABLET | Refills: 0 | Status: SHIPPED | OUTPATIENT
Start: 2020-03-20 | End: 2020-06-20 | Stop reason: SDUPTHER

## 2020-05-15 ENCOUNTER — TELEPHONE (OUTPATIENT)
Dept: ENDOCRINOLOGY | Facility: CLINIC | Age: 50
End: 2020-05-15

## 2020-05-15 ENCOUNTER — TELEPHONE (OUTPATIENT)
Dept: NEUROSURGERY | Facility: CLINIC | Age: 50
End: 2020-05-15

## 2020-05-15 DIAGNOSIS — D49.7 PITUITARY TUMOR: ICD-10-CM

## 2020-05-15 DIAGNOSIS — Z86.018 HISTORY OF PITUITARY ADENOMA: ICD-10-CM

## 2020-05-15 DIAGNOSIS — E89.3 STATUS POST TRANSSPHENOIDAL PITUITARY RESECTION: ICD-10-CM

## 2020-05-15 DIAGNOSIS — E03.8 SECONDARY HYPOTHYROIDISM: Primary | ICD-10-CM

## 2020-05-15 DIAGNOSIS — D49.7 PITUITARY TUMOR: Primary | ICD-10-CM

## 2020-05-20 DIAGNOSIS — D49.7 PITUITARY TUMOR: Primary | ICD-10-CM

## 2020-05-20 DIAGNOSIS — E66.9 OBESITY, UNSPECIFIED CLASSIFICATION, UNSPECIFIED OBESITY TYPE, UNSPECIFIED WHETHER SERIOUS COMORBIDITY PRESENT: ICD-10-CM

## 2020-05-20 NOTE — PROGRESS NOTES
Orders for next visit.      1. Pituitary tumor  - ACTH; Future  - Cortisol, 8AM; Future  - Insulin-like growth factor; Future  - Prolactin; Future  - T4, free; Future  - Testosterone; Future  - Comprehensive metabolic panel; Future    2. Obesity, unspecified classification, unspecified obesity type, unspecified whether serious comorbidity present  - Hemoglobin A1C; Future  - Lipid Panel; Future    Alice Verduzco MD

## 2020-08-03 ENCOUNTER — LAB VISIT (OUTPATIENT)
Dept: LAB | Facility: HOSPITAL | Age: 50
End: 2020-08-03
Attending: INTERNAL MEDICINE
Payer: COMMERCIAL

## 2020-08-03 DIAGNOSIS — E66.9 OBESITY, UNSPECIFIED CLASSIFICATION, UNSPECIFIED OBESITY TYPE, UNSPECIFIED WHETHER SERIOUS COMORBIDITY PRESENT: ICD-10-CM

## 2020-08-03 DIAGNOSIS — D49.7 PITUITARY TUMOR: ICD-10-CM

## 2020-08-03 LAB
ALBUMIN SERPL BCP-MCNC: 4.3 G/DL (ref 3.5–5.2)
ALP SERPL-CCNC: 69 U/L (ref 38–126)
ALT SERPL W/O P-5'-P-CCNC: 36 U/L (ref 10–44)
ANION GAP SERPL CALC-SCNC: 5 MMOL/L (ref 8–16)
AST SERPL-CCNC: 32 U/L (ref 15–46)
BILIRUB SERPL-MCNC: 0.5 MG/DL (ref 0.1–1)
BUN SERPL-MCNC: 19 MG/DL (ref 2–20)
CALCIUM SERPL-MCNC: 9.6 MG/DL (ref 8.7–10.5)
CHLORIDE SERPL-SCNC: 107 MMOL/L (ref 95–110)
CHOLEST SERPL-MCNC: 131 MG/DL (ref 120–199)
CHOLEST/HDLC SERPL: 3.3 {RATIO} (ref 2–5)
CO2 SERPL-SCNC: 28 MMOL/L (ref 23–29)
CREAT SERPL-MCNC: 1.11 MG/DL (ref 0.5–1.4)
EST. GFR  (AFRICAN AMERICAN): >60 ML/MIN/1.73 M^2
EST. GFR  (NON AFRICAN AMERICAN): >60 ML/MIN/1.73 M^2
ESTIMATED AVG GLUCOSE: 103 MG/DL (ref 68–131)
GLUCOSE SERPL-MCNC: 96 MG/DL (ref 70–110)
HBA1C MFR BLD HPLC: 5.2 % (ref 4–5.6)
HDLC SERPL-MCNC: 40 MG/DL (ref 40–75)
HDLC SERPL: 30.5 % (ref 20–50)
LDLC SERPL CALC-MCNC: 43 MG/DL (ref 63–159)
NONHDLC SERPL-MCNC: 91 MG/DL
POTASSIUM SERPL-SCNC: 4.8 MMOL/L (ref 3.5–5.1)
PROT SERPL-MCNC: 7.4 G/DL (ref 6–8.4)
SODIUM SERPL-SCNC: 140 MMOL/L (ref 136–145)
T4 FREE SERPL-MCNC: 0.89 NG/DL (ref 0.71–1.51)
TESTOST SERPL-MCNC: 397 NG/DL (ref 304–1227)
TRIGL SERPL-MCNC: 240 MG/DL (ref 30–150)

## 2020-08-03 PROCEDURE — 36415 COLL VENOUS BLD VENIPUNCTURE: CPT | Mod: PO

## 2020-08-03 PROCEDURE — 82024 ASSAY OF ACTH: CPT | Mod: PO

## 2020-08-03 PROCEDURE — 84146 ASSAY OF PROLACTIN: CPT | Mod: PO

## 2020-08-03 PROCEDURE — 84439 ASSAY OF FREE THYROXINE: CPT

## 2020-08-03 PROCEDURE — 80053 COMPREHEN METABOLIC PANEL: CPT | Mod: PO

## 2020-08-03 PROCEDURE — 82533 TOTAL CORTISOL: CPT | Mod: PO

## 2020-08-03 PROCEDURE — 84403 ASSAY OF TOTAL TESTOSTERONE: CPT | Mod: PO

## 2020-08-03 PROCEDURE — 83036 HEMOGLOBIN GLYCOSYLATED A1C: CPT

## 2020-08-03 PROCEDURE — 84305 ASSAY OF SOMATOMEDIN: CPT | Mod: PO

## 2020-08-03 PROCEDURE — 80061 LIPID PANEL: CPT

## 2020-08-04 LAB
ACTH PLAS-MCNC: 31 PG/ML (ref 0–46)
CORTIS SERPL-MCNC: 10.7 UG/DL (ref 4.3–22.4)
PROLACTIN SERPL IA-MCNC: 9.6 NG/ML (ref 3.5–19.4)

## 2020-08-06 ENCOUNTER — TELEPHONE (OUTPATIENT)
Dept: NEUROSURGERY | Facility: CLINIC | Age: 50
End: 2020-08-06

## 2020-08-06 LAB
IGF-I SERPL-MCNC: 177 NG/ML (ref 40–259)
IGF-I Z-SCORE SERPL: 1.04 SD

## 2020-08-11 ENCOUNTER — OFFICE VISIT (OUTPATIENT)
Dept: ENDOCRINOLOGY | Facility: CLINIC | Age: 50
End: 2020-08-11
Payer: COMMERCIAL

## 2020-08-11 ENCOUNTER — HOSPITAL ENCOUNTER (OUTPATIENT)
Dept: RADIOLOGY | Facility: HOSPITAL | Age: 50
Discharge: HOME | End: 2020-08-11
Attending: NEUROLOGICAL SURGERY
Payer: COMMERCIAL

## 2020-08-11 VITALS
SYSTOLIC BLOOD PRESSURE: 120 MMHG | HEART RATE: 72 BPM | DIASTOLIC BLOOD PRESSURE: 80 MMHG | BODY MASS INDEX: 31.18 KG/M2 | HEIGHT: 72 IN | WEIGHT: 230.19 LBS

## 2020-08-11 DIAGNOSIS — D49.7 PITUITARY TUMOR: ICD-10-CM

## 2020-08-11 DIAGNOSIS — Z86.018 HISTORY OF PITUITARY ADENOMA: ICD-10-CM

## 2020-08-11 DIAGNOSIS — N52.9 ERECTILE DYSFUNCTION, UNSPECIFIED ERECTILE DYSFUNCTION TYPE: ICD-10-CM

## 2020-08-11 DIAGNOSIS — E89.3 STATUS POST TRANSSPHENOIDAL PITUITARY RESECTION: ICD-10-CM

## 2020-08-11 DIAGNOSIS — D35.2 PITUITARY ADENOMA: ICD-10-CM

## 2020-08-11 DIAGNOSIS — E03.8 SECONDARY HYPOTHYROIDISM: ICD-10-CM

## 2020-08-11 DIAGNOSIS — D49.7 PITUITARY TUMOR: Primary | ICD-10-CM

## 2020-08-11 PROCEDURE — 99999 PR PBB SHADOW E&M-EST. PATIENT-LVL III: CPT | Mod: PBBFAC,,, | Performed by: INTERNAL MEDICINE

## 2020-08-11 PROCEDURE — A9585 GADOBUTROL INJECTION: HCPCS | Performed by: NEUROLOGICAL SURGERY

## 2020-08-11 PROCEDURE — 3008F PR BODY MASS INDEX (BMI) DOCUMENTED: ICD-10-PCS | Mod: CPTII,S$GLB,, | Performed by: INTERNAL MEDICINE

## 2020-08-11 PROCEDURE — 99214 PR OFFICE/OUTPT VISIT, EST, LEVL IV, 30-39 MIN: ICD-10-PCS | Mod: S$GLB,,, | Performed by: INTERNAL MEDICINE

## 2020-08-11 PROCEDURE — 99214 OFFICE O/P EST MOD 30 MIN: CPT | Mod: S$GLB,,, | Performed by: INTERNAL MEDICINE

## 2020-08-11 PROCEDURE — 70553 MRI PITUITARY W W/O CONTRAST: ICD-10-PCS | Mod: 26,,, | Performed by: RADIOLOGY

## 2020-08-11 PROCEDURE — 99999 PR PBB SHADOW E&M-EST. PATIENT-LVL III: ICD-10-PCS | Mod: PBBFAC,,, | Performed by: INTERNAL MEDICINE

## 2020-08-11 PROCEDURE — 70553 MRI BRAIN STEM W/O & W/DYE: CPT | Mod: 26,,, | Performed by: RADIOLOGY

## 2020-08-11 PROCEDURE — 3008F BODY MASS INDEX DOCD: CPT | Mod: CPTII,S$GLB,, | Performed by: INTERNAL MEDICINE

## 2020-08-11 PROCEDURE — 70553 MRI BRAIN STEM W/O & W/DYE: CPT | Mod: TC

## 2020-08-11 PROCEDURE — 25500020 PHARM REV CODE 255: Performed by: NEUROLOGICAL SURGERY

## 2020-08-11 RX ORDER — GADOBUTROL 604.72 MG/ML
5 INJECTION INTRAVENOUS
Status: COMPLETED | OUTPATIENT
Start: 2020-08-11 | End: 2020-08-11

## 2020-08-11 RX ADMIN — GADOBUTROL 5 ML: 604.72 INJECTION INTRAVENOUS at 09:08

## 2020-08-11 NOTE — ASSESSMENT & PLAN NOTE
Secondary Hypothyroidism  - Clinically and biochemically euthyroid  - Patient on synthroid 75 mcg PO daily  - Goal is a normal Free T4 in his case (Some literature recommend values in the mid to upper levels of normal)  - Pathophysiology of pituitary pertaining to thyroid function explained in detail     Plan  -Check TFT's in a year and dosage accordingly   -Continue Synthroid 75 mcg PO daily  -Avoid exogenous hyperthyroidism as this can accelerate bone loss and increase risk of CV complications  -Advised to take LT4 on an empty stomach with water and to wait 30-45 minutes before eating or taking other medications

## 2020-08-11 NOTE — ASSESSMENT & PLAN NOTE
Pituitary adenoma - s/p surgery  MRI 8/11/20 shows mild increase around 2mm in size in comparison to MRI from 7/24/18  No anterior pituitary hormonal deficiencies based on current labs  No compressive symptoms reported  Continue following with Dr. Villela as well.     Plan  Will repeat pituitary labs in 1 year  Patient is to Follow up with Neurosurgery

## 2020-08-11 NOTE — ASSESSMENT & PLAN NOTE
Patient denies morning erections  Does have erections but feels they are not as strong as before  Normal total testosterone levels    Plan  Will order testosterone panel  If normal testosterone panel patient advised to follow up with PCP for other causes of erectile dysfunction and possible urology follow up

## 2020-08-11 NOTE — PROGRESS NOTES
I have reviewed and concur with Dr. Henao's history, physical, assessment, and plan.  I have personally interviewed and examined the patient.  See below addendum for my evaluation and additional findings.    I have personally reviewed the MRI from earlier today which shows a persistent right-sided pituitary adenoma that appears to have grown by about 2 mm over the past 2 years.  There may be invasion into the right cavernous sinus however there is no encroachment upon the optic apparatus.  Normal pituitary shifted to the left side of the sella.  His total testosterone is normal however he has complaints of erectile dysfunction, will check testosterone panel and if normal as expected, recommend that he follows up with Urology.  No strong evidence to support secondary hypothyroidism however TSH is normal on current replacement with normal free T4 so will continue levothyroxine.    He will have a follow-up visit with Dr. Villela to discuss MRI results.  It would be reasonable to continue to monitor for continued size increase of adenoma with serial imaging or proceed with repeat resection vs. radiation.    Alice Verduzco MD

## 2020-08-11 NOTE — PROGRESS NOTES
Pituitary Clinic Follow Up    2020    Last seen by NP 2 years ago, first visit w/ me today    Chief Complaint:  No chief complaint on file.    History of Present Illness  Michael Santiago  Is a 50 y.o. man who is s/p TSR 14 with Dr. 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  preop eval indicated non-functional adenoma, path report w/o staining reports only adenoma.  He  Does have some residual tumor in the right side of the sella, being followed w/ MRI every 2 years.      Initial presentation: to ent dizziness ha and neck pain   Found to have a macroadenoma - nonfunctional    Peripheral vision intact. Follows with optho every 6 months.   Currently pt reports no diplopia or chronic headaches.    Imagin18  MRI - hypoenhancing mass in right side of the pituitary gland measuring 11 x 8 by 10 mm.  This has increased in size slightly by around 2 mm compared with the prior study 2016.  There is minimal displacement of the optic chiasm insertion to the left.      Mri 10/31/14  1. History of prior pituitary neoplasm resection with some heterogeneous enhancement to the right of midline as described above which could represent residual pituitary neoplasm versus postsurgical change. Clinical correlation recommended. Follow can   be obtained to document stability.      Lost weight by preference (by healthy eating habits, 20 pounds in the last 2 years)    No early morning erections  Decrease libido more than last time, still having erections though (but per patient not a full erection)  3 kids  23 years old is the youngest one     Denies breast tenderness, gynecomastia, or nipple discharge  Denies headaches, denies loss of vision    Hypothyroidism:  On 75 mcg daily    Takes first thing in the morning 30 minutes prior to meal    He is clinically euthyroid, no palpitations, no irritability, normal energy level     Ref. Range 2017 12:16 3/28/2017 09:00  8/24/2017 09:06 3/22/2018 09:44 8/3/2020 09:04   Cortisol -8 AM Latest Ref Range: 4.30 - 22.40 ug/dL  9.1 4.5 5.6 10.70   Hemoglobin A1C External Latest Ref Range: 4.0 - 5.6 %    5.4 5.2   Estimated Avg Glucose Latest Ref Range: 68 - 131 mg/dL    108 103   ACTH Latest Ref Range: 0 - 46 pg/mL   35 38 31   Somatomedin (IGF-I) Latest Ref Range: 40 - 259 ng/mL     177   TSH Latest Ref Range: 0.400 - 4.000 uIU/mL   0.843 0.824    Free T4 Latest Ref Range: 0.71 - 1.51 ng/dL 0.99  0.78 1.00 0.89        Review of Systems   Constitutional: Negative for unexpected weight change.   Eyes: Negative for visual disturbance.   Respiratory: Negative for shortness of breath.    Cardiovascular: Negative for chest pain.   Gastrointestinal: Negative for abdominal pain.   Endocrine: Negative for cold intolerance, heat intolerance, polydipsia, polyphagia and polyuria.   Musculoskeletal: Negative for arthralgias.   Skin: Negative for wound.   Neurological: Negative for headaches.   Hematological: Does not bruise/bleed easily.   Psychiatric/Behavioral: Negative for sleep disturbance.     Objective:   Body mass index is 31.22 kg/m².  Constitutional:  Pleasant,  in no acute distress.   HENT:   Eyes:     No scleral icterus.   Respiratory:   Effort normal   Neurological:  normal speech  Psych:   Normal mood and affect.      Lab Review:   Lab Results   Component Value Date    HGBA1C 5.2 08/03/2020     Lab Results   Component Value Date    CHOL 131 08/03/2020    HDL 40 08/03/2020    LDLCALC 43.0 (L) 08/03/2020    TRIG 240 (H) 08/03/2020    CHOLHDL 30.5 08/03/2020     Lab Results   Component Value Date     08/03/2020    K 4.8 08/03/2020     08/03/2020    CO2 28 08/03/2020    GLU 96 08/03/2020    BUN 19 08/03/2020    CREATININE 1.11 08/03/2020    CALCIUM 9.6 08/03/2020    PROT 7.4 08/03/2020    ALBUMIN 4.3 08/03/2020    BILITOT 0.5 08/03/2020    ALKPHOS 69 08/03/2020    AST 32 08/03/2020    ALT 36 08/03/2020    ANIONGAP 5 (L) 08/03/2020     ESTGFRAFRICA >60.0 08/03/2020    EGFRNONAA >60.0 08/03/2020    TSH 0.824 03/22/2018       Assessment and Plan       Problem List Items Addressed This Visit        Renal/    Erectile dysfunction     Patient denies morning erections  Does have erections but feels they are not as strong as before  Normal total testosterone levels    Plan  Will order testosterone panel  If normal testosterone panel patient advised to follow up with PCP for other causes of erectile dysfunction and possible urology follow up            Oncology    Pituitary adenoma     Pituitary adenoma - s/p surgery  MRI 8/11/20 shows mild increase around 2mm in size in comparison to MRI from 7/24/18  No anterior pituitary hormonal deficiencies based on current labs  No compressive symptoms reported  Continue following with Dr. Villela as well.     Plan  Will repeat pituitary labs in 1 year  Patient is to Follow up with Neurosurgery         Pituitary tumor - Primary    Relevant Orders    ACTH    Cortisol, 8AM    Insulin-like growth factor    Prolactin    T4, free    Testosterone    TSH    CBC auto differential    Testosterone Panel       Endocrine    Secondary hypothyroidism     Secondary Hypothyroidism  - Clinically and biochemically euthyroid  - Patient on synthroid 75 mcg PO daily  - Goal is a normal Free T4 in his case (Some literature recommend values in the mid to upper levels of normal)  - Pathophysiology of pituitary pertaining to thyroid function explained in detail     Plan  -Check TFT's in a year and dosage accordingly   -Continue Synthroid 75 mcg PO daily  -Avoid exogenous hyperthyroidism as this can accelerate bone loss and increase risk of CV complications  -Advised to take LT4 on an empty stomach with water and to wait 30-45 minutes before eating or taking other medications              RTC in 1 year w/ 8 am labs prior     Magi Henao MD

## 2020-09-01 ENCOUNTER — TELEPHONE (OUTPATIENT)
Dept: NEUROSURGERY | Facility: CLINIC | Age: 50
End: 2020-09-01

## 2020-09-21 DIAGNOSIS — E03.8 SECONDARY HYPOTHYROIDISM: Primary | ICD-10-CM

## 2020-09-21 RX ORDER — LEVOTHYROXINE SODIUM 75 UG/1
75 TABLET ORAL DAILY
Qty: 90 TABLET | Refills: 0 | Status: SHIPPED | OUTPATIENT
Start: 2020-09-21 | End: 2020-12-23 | Stop reason: SDUPTHER

## 2020-09-22 ENCOUNTER — TELEPHONE (OUTPATIENT)
Dept: NEUROSURGERY | Facility: CLINIC | Age: 50
End: 2020-09-22

## 2020-09-22 ENCOUNTER — OFFICE VISIT (OUTPATIENT)
Dept: NEUROSURGERY | Facility: CLINIC | Age: 50
End: 2020-09-22
Payer: COMMERCIAL

## 2020-09-22 DIAGNOSIS — D49.7 PITUITARY TUMOR: Primary | ICD-10-CM

## 2020-09-22 PROCEDURE — 99214 PR OFFICE/OUTPT VISIT, EST, LEVL IV, 30-39 MIN: ICD-10-PCS | Mod: 95,,, | Performed by: NEUROLOGICAL SURGERY

## 2020-09-22 PROCEDURE — 99214 OFFICE O/P EST MOD 30 MIN: CPT | Mod: 95,,, | Performed by: NEUROLOGICAL SURGERY

## 2020-09-22 NOTE — PATIENT INSTRUCTIONS
I have personally reviewed the MRI Pituitary with the pt which shows postsurgical changes from prior pituitary macroadenoma resection with residual tumor in the right cavernous sinus that has gradually increased in size when compared to prior MRI Brain from 08/28/2018.    I recommend treating the residual area of growth with Gamma Knife Radiosurgery. I have discussed the risks/benefits, indications, and alternatives for the proposed procedure in detail.  I have answered all of their questions and the pt wishes to proceed.  We will schedule the pt on the next available date.

## 2020-10-02 ENCOUNTER — OUTSIDE PLACE OF SERVICE (OUTPATIENT)
Dept: NEUROSURGERY | Facility: CLINIC | Age: 50
End: 2020-10-02
Payer: COMMERCIAL

## 2020-10-02 PROCEDURE — 61800 APPLY SRS HEADFRAME ADD-ON: CPT | Mod: S$GLB,,, | Performed by: NEUROLOGICAL SURGERY

## 2020-10-02 PROCEDURE — 61798 SRS CRANIAL LESION COMPLEX: CPT | Mod: S$GLB,,, | Performed by: NEUROLOGICAL SURGERY

## 2020-10-02 PROCEDURE — 61800 PR APPLY STEREOTACTIC HEADFRAME FOR RADIOSURGERY: ICD-10-PCS | Mod: S$GLB,,, | Performed by: NEUROLOGICAL SURGERY

## 2020-10-02 PROCEDURE — 61798 PR STEREOTACTIC RADIOSURGERY, CRANIAL,COMPLEX,SINGLE: ICD-10-PCS | Mod: S$GLB,,, | Performed by: NEUROLOGICAL SURGERY

## 2020-10-06 ENCOUNTER — TELEPHONE (OUTPATIENT)
Dept: NEUROSURGERY | Facility: CLINIC | Age: 50
End: 2020-10-06

## 2020-10-06 DIAGNOSIS — D49.7 PITUITARY TUMOR: Primary | ICD-10-CM

## 2020-10-14 ENCOUNTER — TELEPHONE (OUTPATIENT)
Dept: NEUROSURGERY | Facility: CLINIC | Age: 50
End: 2020-10-14

## 2020-10-14 NOTE — TELEPHONE ENCOUNTER
----- Message from Mariya Gann MA sent at 10/14/2020 11:53 AM CDT -----  Contact: [phone]  Pt has a procedure on 10/2/2020 and thought that he needed to return in 2 weeks but his next appt is not until January.     Please advise [phone]

## 2020-10-15 ENCOUNTER — PATIENT MESSAGE (OUTPATIENT)
Dept: NEUROSURGERY | Facility: CLINIC | Age: 50
End: 2020-10-15

## 2020-10-15 ENCOUNTER — TELEPHONE (OUTPATIENT)
Dept: NEUROSURGERY | Facility: CLINIC | Age: 50
End: 2020-10-15

## 2020-10-15 NOTE — TELEPHONE ENCOUNTER
Called pt in f/u to RS for pituitary. Pt doing fine. No complaints other than posterior site slightly sore.    Sent pt info on where to send disability paperwork. He would like to RTW 10/23.

## 2020-10-16 ENCOUNTER — PATIENT MESSAGE (OUTPATIENT)
Dept: NEUROSURGERY | Facility: CLINIC | Age: 50
End: 2020-10-16

## 2020-12-23 DIAGNOSIS — E03.8 SECONDARY HYPOTHYROIDISM: ICD-10-CM

## 2020-12-23 RX ORDER — LEVOTHYROXINE SODIUM 75 UG/1
75 TABLET ORAL DAILY
Qty: 90 TABLET | Refills: 0 | Status: SHIPPED | OUTPATIENT
Start: 2020-12-23 | End: 2021-03-25

## 2021-01-08 ENCOUNTER — HOSPITAL ENCOUNTER (OUTPATIENT)
Dept: RADIOLOGY | Facility: HOSPITAL | Age: 51
Discharge: HOME | End: 2021-01-08
Attending: NEUROLOGICAL SURGERY
Payer: COMMERCIAL

## 2021-01-08 DIAGNOSIS — D49.7 PITUITARY TUMOR: ICD-10-CM

## 2021-01-08 PROCEDURE — A9585 GADOBUTROL INJECTION: HCPCS | Mod: PO | Performed by: NEUROLOGICAL SURGERY

## 2021-01-08 PROCEDURE — 25500020 PHARM REV CODE 255: Mod: PO | Performed by: NEUROLOGICAL SURGERY

## 2021-01-08 PROCEDURE — 70553 MRI BRAIN STEM W/O & W/DYE: CPT | Mod: TC,PO

## 2021-01-08 RX ORDER — GADOBUTROL 604.72 MG/ML
10 INJECTION INTRAVENOUS
Status: DISCONTINUED | OUTPATIENT
Start: 2021-01-08 | End: 2021-01-09 | Stop reason: HOSPADM

## 2021-01-08 RX ORDER — GADOBUTROL 604.72 MG/ML
10 INJECTION INTRAVENOUS
Status: COMPLETED | OUTPATIENT
Start: 2021-01-08 | End: 2021-01-08

## 2021-01-08 RX ADMIN — GADOBUTROL 10 ML: 604.72 INJECTION INTRAVENOUS at 11:01

## 2021-01-29 ENCOUNTER — CLINICAL SUPPORT (OUTPATIENT)
Dept: OPHTHALMOLOGY | Facility: CLINIC | Age: 51
End: 2021-01-29
Payer: COMMERCIAL

## 2021-01-29 ENCOUNTER — OFFICE VISIT (OUTPATIENT)
Dept: OPHTHALMOLOGY | Facility: CLINIC | Age: 51
End: 2021-01-29
Payer: COMMERCIAL

## 2021-01-29 DIAGNOSIS — H53.452 NASAL STEP VISUAL FIELD DEFECT OF LEFT EYE: ICD-10-CM

## 2021-01-29 DIAGNOSIS — H35.412 LATTICE DEGENERATION OF PERIPHERAL RETINA, LEFT: ICD-10-CM

## 2021-01-29 DIAGNOSIS — D49.7 PITUITARY TUMOR: ICD-10-CM

## 2021-01-29 DIAGNOSIS — H40.022 OPEN ANGLE WITH BORDERLINE FINDINGS AND HIGH GLAUCOMA RISK IN LEFT EYE: ICD-10-CM

## 2021-01-29 DIAGNOSIS — H40.011 OPEN ANGLE WITH BORDERLINE FINDINGS AND LOW GLAUCOMA RISK IN RIGHT EYE: Primary | ICD-10-CM

## 2021-01-29 DIAGNOSIS — H40.021 OPEN ANGLE WITH BORDERLINE FINDINGS AND HIGH GLAUCOMA RISK IN RIGHT EYE: ICD-10-CM

## 2021-01-29 PROCEDURE — 92250 COLOR FUNDUS PHOTOGRAPHY - OU - BOTH EYES: ICD-10-PCS | Mod: S$GLB,,, | Performed by: OPHTHALMOLOGY

## 2021-01-29 PROCEDURE — 92014 PR EYE EXAM, EST PATIENT,COMPREHESV: ICD-10-PCS | Mod: S$GLB,,, | Performed by: OPHTHALMOLOGY

## 2021-01-29 PROCEDURE — 1126F AMNT PAIN NOTED NONE PRSNT: CPT | Mod: S$GLB,,, | Performed by: OPHTHALMOLOGY

## 2021-01-29 PROCEDURE — 99999 PR PBB SHADOW E&M-EST. PATIENT-LVL II: CPT | Mod: PBBFAC,,, | Performed by: OPHTHALMOLOGY

## 2021-01-29 PROCEDURE — 92014 COMPRE OPH EXAM EST PT 1/>: CPT | Mod: S$GLB,,, | Performed by: OPHTHALMOLOGY

## 2021-01-29 PROCEDURE — 1126F PR PAIN SEVERITY QUANTIFIED, NO PAIN PRESENT: ICD-10-PCS | Mod: S$GLB,,, | Performed by: OPHTHALMOLOGY

## 2021-01-29 PROCEDURE — 92250 FUNDUS PHOTOGRAPHY W/I&R: CPT | Mod: S$GLB,,, | Performed by: OPHTHALMOLOGY

## 2021-01-29 PROCEDURE — 99999 PR PBB SHADOW E&M-EST. PATIENT-LVL II: ICD-10-PCS | Mod: PBBFAC,,, | Performed by: OPHTHALMOLOGY

## 2021-01-29 RX ORDER — TIMOLOL MALEATE 5 MG/ML
1 SOLUTION/ DROPS OPHTHALMIC 2 TIMES DAILY
Qty: 5 ML | Refills: 12 | Status: SHIPPED | OUTPATIENT
Start: 2021-01-29 | End: 2021-07-20 | Stop reason: ALTCHOICE

## 2021-02-02 ENCOUNTER — TELEPHONE (OUTPATIENT)
Dept: NEUROSURGERY | Facility: CLINIC | Age: 51
End: 2021-02-02

## 2021-02-02 ENCOUNTER — OFFICE VISIT (OUTPATIENT)
Dept: NEUROSURGERY | Facility: CLINIC | Age: 51
End: 2021-02-02
Payer: COMMERCIAL

## 2021-02-02 DIAGNOSIS — D49.7 PITUITARY TUMOR: Primary | ICD-10-CM

## 2021-02-02 PROCEDURE — 99214 OFFICE O/P EST MOD 30 MIN: CPT | Mod: 95,,, | Performed by: NEUROLOGICAL SURGERY

## 2021-02-02 PROCEDURE — 99214 PR OFFICE/OUTPT VISIT, EST, LEVL IV, 30-39 MIN: ICD-10-PCS | Mod: 95,,, | Performed by: NEUROLOGICAL SURGERY

## 2021-02-03 ENCOUNTER — LAB VISIT (OUTPATIENT)
Dept: LAB | Facility: HOSPITAL | Age: 51
End: 2021-02-03
Attending: NEUROLOGICAL SURGERY
Payer: COMMERCIAL

## 2021-02-03 DIAGNOSIS — D49.7 PITUITARY TUMOR: ICD-10-CM

## 2021-02-03 LAB
ALBUMIN SERPL BCP-MCNC: 4.3 G/DL (ref 3.5–5.2)
ALP SERPL-CCNC: 82 U/L (ref 55–135)
ALT SERPL W/O P-5'-P-CCNC: 59 U/L (ref 10–44)
ANION GAP SERPL CALC-SCNC: 10 MMOL/L (ref 8–16)
AST SERPL-CCNC: 35 U/L (ref 10–40)
BILIRUB SERPL-MCNC: 0.4 MG/DL (ref 0.1–1)
BUN SERPL-MCNC: 19 MG/DL (ref 6–20)
CALCIUM SERPL-MCNC: 9.1 MG/DL (ref 8.7–10.5)
CHLORIDE SERPL-SCNC: 106 MMOL/L (ref 95–110)
CO2 SERPL-SCNC: 23 MMOL/L (ref 23–29)
CORTIS SERPL-MCNC: 13.5 UG/DL (ref 4.3–22.4)
CREAT SERPL-MCNC: 1.2 MG/DL (ref 0.5–1.4)
EST. GFR  (AFRICAN AMERICAN): >60 ML/MIN/1.73 M^2
EST. GFR  (NON AFRICAN AMERICAN): >60 ML/MIN/1.73 M^2
FSH SERPL-ACNC: 3 MIU/ML (ref 0.95–11.95)
GLUCOSE SERPL-MCNC: 97 MG/DL (ref 70–110)
LH SERPL-ACNC: 2.9 MIU/ML (ref 0.6–12.1)
POTASSIUM SERPL-SCNC: 4.6 MMOL/L (ref 3.5–5.1)
PROLACTIN SERPL IA-MCNC: 7.9 NG/ML (ref 3.5–19.4)
PROT SERPL-MCNC: 7.5 G/DL (ref 6–8.4)
SODIUM SERPL-SCNC: 139 MMOL/L (ref 136–145)
T4 FREE SERPL-MCNC: 0.93 NG/DL (ref 0.71–1.51)
TESTOST SERPL-MCNC: 280 NG/DL (ref 304–1227)

## 2021-02-03 PROCEDURE — 84439 ASSAY OF FREE THYROXINE: CPT

## 2021-02-03 PROCEDURE — 82533 TOTAL CORTISOL: CPT

## 2021-02-03 PROCEDURE — 36415 COLL VENOUS BLD VENIPUNCTURE: CPT

## 2021-02-03 PROCEDURE — 84403 ASSAY OF TOTAL TESTOSTERONE: CPT

## 2021-02-03 PROCEDURE — 80053 COMPREHEN METABOLIC PANEL: CPT

## 2021-02-03 PROCEDURE — 83001 ASSAY OF GONADOTROPIN (FSH): CPT

## 2021-02-03 PROCEDURE — 84146 ASSAY OF PROLACTIN: CPT

## 2021-02-03 PROCEDURE — 84305 ASSAY OF SOMATOMEDIN: CPT

## 2021-02-03 PROCEDURE — 83002 ASSAY OF GONADOTROPIN (LH): CPT

## 2021-02-03 PROCEDURE — 82024 ASSAY OF ACTH: CPT

## 2021-02-05 LAB — ACTH PLAS-MCNC: 43 PG/ML (ref 0–46)

## 2021-02-08 LAB
IGF-I SERPL-MCNC: 185 NG/ML (ref 40–259)
IGF-I Z-SCORE SERPL: 1.04 SD

## 2021-02-15 ENCOUNTER — OFFICE VISIT (OUTPATIENT)
Dept: ENDOCRINOLOGY | Facility: CLINIC | Age: 51
End: 2021-02-15
Payer: COMMERCIAL

## 2021-02-15 DIAGNOSIS — R79.89 LOW TESTOSTERONE IN MALE: ICD-10-CM

## 2021-02-15 DIAGNOSIS — D35.2 PITUITARY ADENOMA: ICD-10-CM

## 2021-02-15 DIAGNOSIS — N52.9 ERECTILE DYSFUNCTION, UNSPECIFIED ERECTILE DYSFUNCTION TYPE: Primary | ICD-10-CM

## 2021-02-15 DIAGNOSIS — E03.8 SECONDARY HYPOTHYROIDISM: ICD-10-CM

## 2021-02-15 PROCEDURE — 99214 OFFICE O/P EST MOD 30 MIN: CPT | Mod: 95,,, | Performed by: INTERNAL MEDICINE

## 2021-02-15 PROCEDURE — 99214 PR OFFICE/OUTPT VISIT, EST, LEVL IV, 30-39 MIN: ICD-10-PCS | Mod: 95,,, | Performed by: INTERNAL MEDICINE

## 2021-02-22 ENCOUNTER — LAB VISIT (OUTPATIENT)
Dept: LAB | Facility: HOSPITAL | Age: 51
End: 2021-02-22
Attending: INTERNAL MEDICINE
Payer: COMMERCIAL

## 2021-02-22 DIAGNOSIS — N52.9 ERECTILE DYSFUNCTION, UNSPECIFIED ERECTILE DYSFUNCTION TYPE: ICD-10-CM

## 2021-02-22 DIAGNOSIS — D35.2 PITUITARY ADENOMA: ICD-10-CM

## 2021-02-22 LAB
BASOPHILS # BLD AUTO: 0.02 K/UL (ref 0–0.2)
BASOPHILS NFR BLD: 0.6 % (ref 0–1.9)
COMPLEXED PSA SERPL-MCNC: 1.1 NG/ML (ref 0–4)
DIFFERENTIAL METHOD: ABNORMAL
EOSINOPHIL # BLD AUTO: 0.1 K/UL (ref 0–0.5)
EOSINOPHIL NFR BLD: 2.9 % (ref 0–8)
ERYTHROCYTE [DISTWIDTH] IN BLOOD BY AUTOMATED COUNT: 12.9 % (ref 11.5–14.5)
HCT VFR BLD AUTO: 46.2 % (ref 40–54)
HGB BLD-MCNC: 15.4 G/DL (ref 14–18)
IMM GRANULOCYTES # BLD AUTO: 0.01 K/UL (ref 0–0.04)
IMM GRANULOCYTES NFR BLD AUTO: 0.3 % (ref 0–0.5)
LYMPHOCYTES # BLD AUTO: 1.7 K/UL (ref 1–4.8)
LYMPHOCYTES NFR BLD: 48 % (ref 18–48)
MCH RBC QN AUTO: 29.4 PG (ref 27–31)
MCHC RBC AUTO-ENTMCNC: 33.3 G/DL (ref 32–36)
MCV RBC AUTO: 88 FL (ref 82–98)
MONOCYTES # BLD AUTO: 0.3 K/UL (ref 0.3–1)
MONOCYTES NFR BLD: 9 % (ref 4–15)
NEUTROPHILS # BLD AUTO: 1.4 K/UL (ref 1.8–7.7)
NEUTROPHILS NFR BLD: 39.2 % (ref 38–73)
NRBC BLD-RTO: 0 /100 WBC
PLATELET # BLD AUTO: 173 K/UL (ref 150–350)
PMV BLD AUTO: 10.5 FL (ref 9.2–12.9)
RBC # BLD AUTO: 5.23 M/UL (ref 4.6–6.2)
T4 FREE SERPL-MCNC: 0.94 NG/DL (ref 0.71–1.51)
WBC # BLD AUTO: 3.46 K/UL (ref 3.9–12.7)

## 2021-02-22 PROCEDURE — 84146 ASSAY OF PROLACTIN: CPT

## 2021-02-22 PROCEDURE — 82024 ASSAY OF ACTH: CPT

## 2021-02-22 PROCEDURE — 85025 COMPLETE CBC W/AUTO DIFF WBC: CPT

## 2021-02-22 PROCEDURE — 82533 TOTAL CORTISOL: CPT

## 2021-02-22 PROCEDURE — 36415 COLL VENOUS BLD VENIPUNCTURE: CPT

## 2021-02-22 PROCEDURE — 84439 ASSAY OF FREE THYROXINE: CPT

## 2021-02-22 PROCEDURE — 84305 ASSAY OF SOMATOMEDIN: CPT

## 2021-02-22 PROCEDURE — 84403 ASSAY OF TOTAL TESTOSTERONE: CPT

## 2021-02-22 PROCEDURE — 84153 ASSAY OF PSA TOTAL: CPT

## 2021-02-23 LAB
ACTH PLAS-MCNC: 41 PG/ML (ref 0–46)
CORTIS SERPL-MCNC: 12.9 UG/DL (ref 4.3–22.4)
PROLACTIN SERPL IA-MCNC: 9 NG/ML (ref 3.5–19.4)

## 2021-02-26 LAB
IGF-I SERPL-MCNC: 140 NG/ML (ref 40–259)
IGF-I Z-SCORE SERPL: 0.39 SD

## 2021-02-28 LAB
ALBUMIN SERPL-MCNC: 4.1 G/DL (ref 3.6–5.1)
SHBG SERPL-SCNC: 28 NMOL/L (ref 10–50)
TESTOST FREE SERPL-MCNC: 61.5 PG/ML (ref 46–224)
TESTOST SERPL-MCNC: 400 NG/DL (ref 250–1100)
TESTOSTERONE.FREE+WB SERPL-MCNC: 115.8 NG/DL (ref 110–575)

## 2021-04-01 RX ORDER — ALLOPURINOL 300 MG/1
300 TABLET ORAL DAILY
Qty: 90 TABLET | Refills: 0 | OUTPATIENT
Start: 2021-04-01

## 2021-04-05 RX ORDER — ALLOPURINOL 300 MG/1
300 TABLET ORAL DAILY
Qty: 30 TABLET | Refills: 0 | Status: SHIPPED | OUTPATIENT
Start: 2021-04-05 | End: 2023-07-11 | Stop reason: SDUPTHER

## 2021-04-20 ENCOUNTER — OFFICE VISIT (OUTPATIENT)
Dept: OPHTHALMOLOGY | Facility: CLINIC | Age: 51
End: 2021-04-20
Payer: COMMERCIAL

## 2021-04-20 DIAGNOSIS — H40.022 OPEN ANGLE WITH BORDERLINE FINDINGS AND HIGH GLAUCOMA RISK IN LEFT EYE: Primary | ICD-10-CM

## 2021-04-20 DIAGNOSIS — H40.011 OPEN ANGLE WITH BORDERLINE FINDINGS AND LOW GLAUCOMA RISK IN RIGHT EYE: ICD-10-CM

## 2021-04-20 DIAGNOSIS — H35.412 LATTICE DEGENERATION OF PERIPHERAL RETINA, LEFT: ICD-10-CM

## 2021-04-20 DIAGNOSIS — H53.452 NASAL STEP VISUAL FIELD DEFECT OF LEFT EYE: ICD-10-CM

## 2021-04-20 DIAGNOSIS — D49.7 PITUITARY TUMOR: ICD-10-CM

## 2021-04-20 PROCEDURE — 92133 CPTRZD OPH DX IMG PST SGM ON: CPT | Mod: S$GLB,,, | Performed by: OPHTHALMOLOGY

## 2021-04-20 PROCEDURE — 1126F PR PAIN SEVERITY QUANTIFIED, NO PAIN PRESENT: ICD-10-PCS | Mod: S$GLB,,, | Performed by: OPHTHALMOLOGY

## 2021-04-20 PROCEDURE — 92012 PR EYE EXAM, EST PATIENT,INTERMED: ICD-10-PCS | Mod: S$GLB,,, | Performed by: OPHTHALMOLOGY

## 2021-04-20 PROCEDURE — 92133 POSTERIOR SEGMENT OCT OPTIC NERVE(OCULAR COHERENCE TOMOGRAPHY) - OU - BOTH EYES: ICD-10-PCS | Mod: S$GLB,,, | Performed by: OPHTHALMOLOGY

## 2021-04-20 PROCEDURE — 92012 INTRM OPH EXAM EST PATIENT: CPT | Mod: S$GLB,,, | Performed by: OPHTHALMOLOGY

## 2021-04-20 PROCEDURE — 99999 PR PBB SHADOW E&M-EST. PATIENT-LVL II: CPT | Mod: PBBFAC,,, | Performed by: OPHTHALMOLOGY

## 2021-04-20 PROCEDURE — 99999 PR PBB SHADOW E&M-EST. PATIENT-LVL II: ICD-10-PCS | Mod: PBBFAC,,, | Performed by: OPHTHALMOLOGY

## 2021-04-20 PROCEDURE — 1126F AMNT PAIN NOTED NONE PRSNT: CPT | Mod: S$GLB,,, | Performed by: OPHTHALMOLOGY

## 2021-04-20 RX ORDER — LATANOPROST 50 UG/ML
1 SOLUTION/ DROPS OPHTHALMIC DAILY
Qty: 3 BOTTLE | Refills: 3 | Status: SHIPPED | OUTPATIENT
Start: 2021-04-20 | End: 2022-03-07 | Stop reason: SDUPTHER

## 2021-05-06 ENCOUNTER — PATIENT MESSAGE (OUTPATIENT)
Dept: RESEARCH | Facility: HOSPITAL | Age: 51
End: 2021-05-06

## 2021-07-20 ENCOUNTER — OFFICE VISIT (OUTPATIENT)
Dept: OPHTHALMOLOGY | Facility: CLINIC | Age: 51
End: 2021-07-20
Payer: COMMERCIAL

## 2021-07-20 DIAGNOSIS — H40.011 OPEN ANGLE WITH BORDERLINE FINDINGS AND LOW GLAUCOMA RISK IN RIGHT EYE: Primary | ICD-10-CM

## 2021-07-20 DIAGNOSIS — H40.022 OPEN ANGLE WITH BORDERLINE FINDINGS AND HIGH GLAUCOMA RISK IN LEFT EYE: ICD-10-CM

## 2021-07-20 DIAGNOSIS — H35.412 LATTICE DEGENERATION OF PERIPHERAL RETINA, LEFT: ICD-10-CM

## 2021-07-20 DIAGNOSIS — D49.7 PITUITARY TUMOR: ICD-10-CM

## 2021-07-20 DIAGNOSIS — H53.452 NASAL STEP VISUAL FIELD DEFECT OF LEFT EYE: ICD-10-CM

## 2021-07-20 PROCEDURE — 99999 PR PBB SHADOW E&M-EST. PATIENT-LVL II: CPT | Mod: PBBFAC,,, | Performed by: OPHTHALMOLOGY

## 2021-07-20 PROCEDURE — 99999 PR PBB SHADOW E&M-EST. PATIENT-LVL II: ICD-10-PCS | Mod: PBBFAC,,, | Performed by: OPHTHALMOLOGY

## 2021-07-20 PROCEDURE — 1126F PR PAIN SEVERITY QUANTIFIED, NO PAIN PRESENT: ICD-10-PCS | Mod: CPTII,S$GLB,, | Performed by: OPHTHALMOLOGY

## 2021-07-20 PROCEDURE — 92012 INTRM OPH EXAM EST PATIENT: CPT | Mod: S$GLB,,, | Performed by: OPHTHALMOLOGY

## 2021-07-20 PROCEDURE — 1126F AMNT PAIN NOTED NONE PRSNT: CPT | Mod: CPTII,S$GLB,, | Performed by: OPHTHALMOLOGY

## 2021-07-20 PROCEDURE — 92012 PR EYE EXAM, EST PATIENT,INTERMED: ICD-10-PCS | Mod: S$GLB,,, | Performed by: OPHTHALMOLOGY

## 2021-07-20 RX ORDER — FENOFIBRATE 48 MG/1
48 TABLET, FILM COATED ORAL DAILY
COMMUNITY
Start: 2021-07-07 | End: 2022-11-14 | Stop reason: ALTCHOICE

## 2021-09-03 ENCOUNTER — PATIENT MESSAGE (OUTPATIENT)
Dept: NEUROSURGERY | Facility: CLINIC | Age: 51
End: 2021-09-03

## 2022-03-02 DIAGNOSIS — D49.7 PITUITARY TUMOR: Primary | ICD-10-CM

## 2022-03-02 DIAGNOSIS — Z86.018 HISTORY OF PITUITARY ADENOMA: ICD-10-CM

## 2022-03-07 ENCOUNTER — OFFICE VISIT (OUTPATIENT)
Dept: OPHTHALMOLOGY | Facility: CLINIC | Age: 52
End: 2022-03-07
Payer: COMMERCIAL

## 2022-03-07 DIAGNOSIS — H40.011 OPEN ANGLE WITH BORDERLINE FINDINGS AND LOW GLAUCOMA RISK IN RIGHT EYE: Primary | ICD-10-CM

## 2022-03-07 DIAGNOSIS — H40.022 OPEN ANGLE WITH BORDERLINE FINDINGS AND HIGH GLAUCOMA RISK IN LEFT EYE: ICD-10-CM

## 2022-03-07 DIAGNOSIS — H35.412 LATTICE DEGENERATION OF PERIPHERAL RETINA, LEFT: ICD-10-CM

## 2022-03-07 DIAGNOSIS — D49.7 PITUITARY TUMOR: ICD-10-CM

## 2022-03-07 DIAGNOSIS — H53.452 NASAL STEP VISUAL FIELD DEFECT OF LEFT EYE: ICD-10-CM

## 2022-03-07 PROCEDURE — 92012 INTRM OPH EXAM EST PATIENT: CPT | Mod: S$GLB,,, | Performed by: OPHTHALMOLOGY

## 2022-03-07 PROCEDURE — 1159F PR MEDICATION LIST DOCUMENTED IN MEDICAL RECORD: ICD-10-PCS | Mod: CPTII,S$GLB,, | Performed by: OPHTHALMOLOGY

## 2022-03-07 PROCEDURE — 1159F MED LIST DOCD IN RCRD: CPT | Mod: CPTII,S$GLB,, | Performed by: OPHTHALMOLOGY

## 2022-03-07 PROCEDURE — 99999 PR PBB SHADOW E&M-EST. PATIENT-LVL II: ICD-10-PCS | Mod: PBBFAC,,, | Performed by: OPHTHALMOLOGY

## 2022-03-07 PROCEDURE — 99999 PR PBB SHADOW E&M-EST. PATIENT-LVL II: CPT | Mod: PBBFAC,,, | Performed by: OPHTHALMOLOGY

## 2022-03-07 PROCEDURE — 92012 PR EYE EXAM, EST PATIENT,INTERMED: ICD-10-PCS | Mod: S$GLB,,, | Performed by: OPHTHALMOLOGY

## 2022-03-07 RX ORDER — LATANOPROST 50 UG/ML
1 SOLUTION/ DROPS OPHTHALMIC DAILY
Qty: 3 ML | Refills: 3 | Status: SHIPPED | OUTPATIENT
Start: 2022-03-07 | End: 2022-07-26

## 2022-03-07 NOTE — PROGRESS NOTES
HPI     DLS: 7/20/2021    Pt here for 4 Month Check;  Pt states no eye pain or discomfort.     Meds;  Latanoprost QHS OU    1) LTG Suspect   2) Hx Pituitary Tumor   3) VF Defect OS   4) Hx Dot Heme OU     Last edited by Laura Mireles on 3/7/2022 11:06 AM. (History)              Assessment /Plan     For exam results, see Encounter Report.    Open angle with borderline findings and low glaucoma risk in right eye    Open angle with borderline findings and high glaucoma risk in left eye    Lattice degeneration of peripheral retina, left    Nasal step visual field defect of left eye    Pituitary tumor        Lost to F/U 8/2018 to 2/2021     H/O eye pain    Resolved with NSADI's    Likely sinus issues   ?? If aggravated by the latanoprost     LTG suspect - NOT on treatment   2/2 abnl VF test (INS os ) (VF test done 2/2 H/O pit tumor)  Neg FmHx     First HVF   11/2014   First photos   12/2014   Treatment / Drops started   11/2014 // stopped 2/12/2015 - 2/2 eye pain            Family history    neg        Glaucoma meds    Latanoprost started 11/2014 - 2/12/2015 - off 2015 to 2021 -  tolerating again 7/2021         H/O adverse rxn to glaucoma drops - (used timolol without problems - but went back to xalatan - as it is 1 x day)         LASERS    none        GLAUCOMA SURGERIES    none        OTHER EYE SURGERIES    none        CDR    0.7 / 0/75        Tbase    10-11 / 8-9          Tmax    11/9            Ttarget    ?             HVF    5 test 2014 to  2017 - nl od // INS os        Gonio    +3 ou        CCT    480/477 (thin ou)        OCT    3 test 2015 to 2021 - RNFL - dec T, bord N  od // dec. G/TS/T/TI, bord N   os        HRT    2 test 2015 - to 2017 - MR -  Dec. Dec. N/I, bord S/T od // dec. S os /// CDR 0.8 od // 0.7 os        Disc photos    - 2014, 2017  - OIS     - Ttoday  8/8   - Test done today   IOP check on latanoprost       2. Pit tumor    S/P resection    No associated VF loss   Patient to sees a neurologist     3.  VF defect OS   INS    4. H/O Dot heme ou    Minimal - see photos 12/2014 - no H/O diabetes - resolved    PLAN  LTG suspect on basis of VF defect os and ON exam   Hx pituitary tumor with VF defects - confounds HVF    1/29/2021  OD HVF somewhat worse since 2018 but overall not bad  OS HVF with same inf nasal step     - timolol bid ou - appears to have a IAD od presently and some RNFL thinning on past exams(1/2021)   Timolol with good response - but pt would like to re-try latanoprost since it is just 1 x day    If he gets eye pain again (like a few years back) can switch back to timolol - tolerating well     Provided MRx   - gets a free pair every year () 1/29/2021)     Recent worsening of pit tumor - had radiation treatments - oct 2020    Waiting on result of MRI  - seeing the doctor next week to review results     Lattice OS - discussed s/s RD    Photo file updated 4/20/2021     F/U 4 months with OCT ONH OU // 24-2 OU // DFE OU

## 2022-03-24 ENCOUNTER — TELEPHONE (OUTPATIENT)
Dept: NEUROSURGERY | Facility: CLINIC | Age: 52
End: 2022-03-24
Payer: COMMERCIAL

## 2022-03-24 DIAGNOSIS — D49.7 PITUITARY TUMOR: Primary | ICD-10-CM

## 2022-03-24 NOTE — TELEPHONE ENCOUNTER
Mri order has been faxed over as requested.           ----- Message from Nan Flores sent at 3/24/2022 12:45 PM CDT -----  Patient Call Back    Who Called: Nan/ Charlene    What is the request in detail:Nna calling to speak with someone regarding the pt MRI of the Brain with and without contrast. She stated that the order need to be sent [facility], fax:[phone]    Can the clinic reply by [facility]?    Best Call Back Number: [facility] [phone]

## 2022-04-04 ENCOUNTER — HOSPITAL ENCOUNTER (OUTPATIENT)
Dept: RADIOLOGY | Facility: HOSPITAL | Age: 52
Discharge: HOME | End: 2022-04-04
Attending: NEUROLOGICAL SURGERY
Payer: COMMERCIAL

## 2022-04-04 DIAGNOSIS — Z86.018 HISTORY OF PITUITARY ADENOMA: ICD-10-CM

## 2022-04-04 DIAGNOSIS — D49.7 PITUITARY TUMOR: ICD-10-CM

## 2022-04-04 PROCEDURE — A9585 GADOBUTROL INJECTION: HCPCS | Mod: PO | Performed by: NEUROLOGICAL SURGERY

## 2022-04-04 PROCEDURE — 70553 MRI BRAIN STEM W/O & W/DYE: CPT | Mod: TC,PO

## 2022-04-04 PROCEDURE — 25500020 PHARM REV CODE 255: Mod: PO | Performed by: NEUROLOGICAL SURGERY

## 2022-04-04 RX ORDER — GADOBUTROL 604.72 MG/ML
10 INJECTION INTRAVENOUS
Status: COMPLETED | OUTPATIENT
Start: 2022-04-04 | End: 2022-04-04

## 2022-04-04 RX ADMIN — GADOBUTROL 10 ML: 604.72 INJECTION INTRAVENOUS at 12:04

## 2022-04-04 NOTE — PROGRESS NOTES
Pituitary Clinic Follow Up    04/04/2022    The patient location is: home    Visit type: audiovisual (done via doximity due to problems with patient starting visit in [facility])    Face to Face time with patient: 20  30 minutes of total time spent on the encounter, which includes face to face time and non-face to face time preparing to see the patient (eg, review of tests), Obtaining and/or reviewing separately obtained history, Documenting clinical information in the electronic or other health record, Independently interpreting results (not separately reported) and communicating results to the patient/family/caregiver, or Care coordination (not separately reported).     Each patient to whom he or she provides medical services by telemedicine is:  (1) informed of the relationship between the physician and patient and the respective role of any other health care provider with respect to management of the patient; and (2) notified that he or she may decline to receive medical services by telemedicine and may withdraw from such care at any time.    The patient's last visit with me was on 2/15/2021.     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    History of Present Illness  Michael Santiago  Is a 52 y.o. man who is s/p TSR 6/30/14 with Dr. 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  preop eval indicated non-functional adenoma, path report w/o staining reports only adenoma.  He  Does have some residual tumor in the right side of the sella, being followed w/ MRI, found to have growth in 8/2020 so he was treated w/ GKRS in 10/2020.     Initial presentation: to ent dizziness ha and neck pain   Found to have a macroadenoma - nonfunctional    Interval hx:  The patient was seen in the multidisciplinary pituitary clinic with Dr. Villela today.   He denies any new medical problems or concerns since his last visit.    Had labs drawn yesterday for CBC and testosterone (result pending) but other pituitary labs not done.   MRI yesterday with decrease in size of adenoma    denies symptoms of adrenal insufficiency (lightheadedness, N/V/abd pain, hypotension).     No unexplained wt loss- current wt on home scale 250 lbs (gradual increase over last couple years)  No HA or vision change (follows with eye doctor for glaucoma every 6 month(s)- next due in 2022)    In the past referred to urology for ED but has not seen them yet.  Reports normal libido but incomplete erections.    Regarding secondary Hypothyroidism:  On 75 mcg daily    Takes first thing in the morning 30 minutes prior to meal    Lab Results   Component Value Date    TSH 0.824 2018    FREET4 0.94 2021       Thyroid Symptoms  Normal energy    Weight change:  []  Gain []  Loss  [x]  Denies    gradual trend up    Temperature intolerance:  []  Cold []  Hot   [x]  Denies     GI:  []  Diarrhea []  Constipation [x]  Denies    Integument:  []  Hair loss []  Dry skin  [x]  Denies    Other:  []  Palpitation []  tremor     []  Increased anxiety    [x]  Denies      PSA, Screen (ng/mL)   Date Value   2021 1.1   Imagin2022:   Sella: The known hypoenhancing focus within the right aspect of the adenohypophysis is slightly smaller than on the study from 2021.  It measures 8 mm craniocaudal by 12 mm transverse compared to prior 11 mm craniocaudal by 12 mm transverse.  There is diminished suprasellar extension with persistent deviation of the infundibulum toward the left.    2020  Postsurgical changes from prior pituitary macroadenoma resection.  Hypoenhancing mass in the right side of the pituitary gland which measures approximately 1.6 x 0.9 x 1.3 cm, increased from 1.1 x 0.8 x 1.0 cm (2018) and 0.9 x 0.8 x 0.5 cm (2016).  Persistent mild leftward infundibular displacement.  No significant mass effect on the optic chiasm or adjacent  internal carotid artery.     8/28/18  MRI - hypoenhancing mass in right side of the pituitary gland measuring 11 x 8 by 10 mm.  This has increased in size slightly by around 2 mm compared with the prior study 08/02/2016.  There is minimal displacement of the optic chiasm insertion to the left.      Mri 10/31/14  1. History of prior pituitary neoplasm resection with some heterogeneous enhancement to the right of midline as described above which could represent residual pituitary neoplasm versus postsurgical change. Clinical correlation recommended. Follow can   be obtained to document stability.      Current Outpatient Medications:     allopurinoL (ZYLOPRIM) 300 MG tablet, Take 1 tablet (300 mg total) by mouth once daily., Disp: 30 tablet, Rfl: 0    fenofibrate (TRICOR) 48 MG tablet, Take 48 mg by mouth once daily., Disp: , Rfl:     latanoprost 0.005 % ophthalmic solution, Place 1 drop into both eyes once daily. This replaces the timolol, Disp: 3 mL, Rfl: 3    levothyroxine (SYNTHROID) 75 MCG tablet, Take 1 tablet (75 mcg total) by mouth once daily., Disp: 90 tablet, Rfl: 0  No current facility-administered medications for this visit.   ROS: see hpi    Objective:   There is no height or weight on file to calculate BMI.  Virtual visit - no exam     Lab Review:   Lab Results   Component Value Date    HGBA1C 5.2 08/03/2020     Lab Results   Component Value Date    CHOL 131 08/03/2020    HDL 40 08/03/2020    LDLCALC 43.0 (L) 08/03/2020    TRIG 240 (H) 08/03/2020    CHOLHDL 30.5 08/03/2020     Lab Results   Component Value Date     02/03/2021    K 4.6 02/03/2021     02/03/2021    CO2 23 02/03/2021    GLU 97 02/03/2021    BUN 19 02/03/2021    CREATININE 1.2 02/03/2021    CALCIUM 9.1 02/03/2021    PROT 7.5 02/03/2021    ALBUMIN 4.1 02/22/2021    BILITOT 0.4 02/03/2021    ALKPHOS 82 02/03/2021    AST 35 02/03/2021    ALT 59 (H) 02/03/2021    ANIONGAP 10 02/03/2021    ESTGFRAFRICA >60 02/03/2021    EGFRNONAA  >60 02/03/2021    TSH 0.824 03/22/2018     Pituitary MRI 4/4/22      Assessment and Plan       Problem List Items Addressed This Visit        1 - High    Pituitary adenoma      I have independently reviewed the pituitary MRI from 04/04/2022.   The pituitary stalk is slightly deviated towards with normal pituitary gland in the left aspect of the sella.  There remains a hypoenhancing lesion in the right aspect of the sella with no significant suprasellar extension.  Compared to previous imaging 1 year ago there has been a slight decrease in size.  The left no impingement upon the optic apparatus.        Biochemical evaluation incomplete, only had testosterone level drawn yesterday at 10:30 am with result pending.  Discussed that it if is low we may need to repeat it 8 am fasting with rest of pituitary labs in 2 wks.            Relevant Orders    Ambulatory referral/consult to Urology    ACTH    Cortisol, 8AM    Luteinizing Hormone    Insulin-Like Growth Factor    Prolactin    T4, Free       2     Secondary hypothyroidism     Clinically euthyroid aside from gradual wt gain.  Will check fT4 in 2 weeks and adjust dose of levothyroxine (currently on 75 mcg daily) to keep fT4 in upper half of normal.               3     History of radiation therapy     Currently patient has no symptoms of adrenal insufficiency or hypothyroidism however we reviewed that given history of gamma knife radiosurgery to the sella, the patient is at risk of developing new pituitary hormone deficiencies at any time.  They will let me know if they have symptoms of adrenal insufficiency.  Will plan to screen with yearly labs.                4     Low testosterone in male     Hx of slightly low testosterone as well as normal testosterone levels with normal libido but still incomplete erections.  Referred to urology for further evaluation.                 8 am fasting labs in 2 weeks  RTC in 1 year pituitary clinic in person    Alice Verduzco MD

## 2022-04-05 ENCOUNTER — OFFICE VISIT (OUTPATIENT)
Dept: NEUROSURGERY | Facility: CLINIC | Age: 52
End: 2022-04-05
Payer: COMMERCIAL

## 2022-04-05 ENCOUNTER — OFFICE VISIT (OUTPATIENT)
Dept: ENDOCRINOLOGY | Facility: CLINIC | Age: 52
End: 2022-04-05
Payer: COMMERCIAL

## 2022-04-05 DIAGNOSIS — D49.7 PITUITARY TUMOR: Primary | ICD-10-CM

## 2022-04-05 DIAGNOSIS — E89.3 STATUS POST TRANSSPHENOIDAL PITUITARY RESECTION: ICD-10-CM

## 2022-04-05 DIAGNOSIS — E03.8 SECONDARY HYPOTHYROIDISM: ICD-10-CM

## 2022-04-05 DIAGNOSIS — R79.89 LOW TESTOSTERONE IN MALE: ICD-10-CM

## 2022-04-05 DIAGNOSIS — Z92.3 HISTORY OF RADIATION THERAPY: ICD-10-CM

## 2022-04-05 DIAGNOSIS — D35.2 PITUITARY ADENOMA: ICD-10-CM

## 2022-04-05 PROCEDURE — 1159F PR MEDICATION LIST DOCUMENTED IN MEDICAL RECORD: ICD-10-PCS | Mod: CPTII,95,, | Performed by: NEUROLOGICAL SURGERY

## 2022-04-05 PROCEDURE — 1160F PR REVIEW ALL MEDS BY PRESCRIBER/CLIN PHARMACIST DOCUMENTED: ICD-10-PCS | Mod: CPTII,95,, | Performed by: NEUROLOGICAL SURGERY

## 2022-04-05 PROCEDURE — 99214 PR OFFICE/OUTPT VISIT, EST, LEVL IV, 30-39 MIN: ICD-10-PCS | Mod: 95,,, | Performed by: INTERNAL MEDICINE

## 2022-04-05 PROCEDURE — 1160F RVW MEDS BY RX/DR IN RCRD: CPT | Mod: CPTII,95,, | Performed by: INTERNAL MEDICINE

## 2022-04-05 PROCEDURE — 1159F PR MEDICATION LIST DOCUMENTED IN MEDICAL RECORD: ICD-10-PCS | Mod: CPTII,95,, | Performed by: INTERNAL MEDICINE

## 2022-04-05 PROCEDURE — 1160F RVW MEDS BY RX/DR IN RCRD: CPT | Mod: CPTII,95,, | Performed by: NEUROLOGICAL SURGERY

## 2022-04-05 PROCEDURE — 99214 PR OFFICE/OUTPT VISIT, EST, LEVL IV, 30-39 MIN: ICD-10-PCS | Mod: 95,,, | Performed by: NEUROLOGICAL SURGERY

## 2022-04-05 PROCEDURE — 1159F MED LIST DOCD IN RCRD: CPT | Mod: CPTII,95,, | Performed by: NEUROLOGICAL SURGERY

## 2022-04-05 PROCEDURE — 99214 OFFICE O/P EST MOD 30 MIN: CPT | Mod: 95,,, | Performed by: NEUROLOGICAL SURGERY

## 2022-04-05 PROCEDURE — 1160F PR REVIEW ALL MEDS BY PRESCRIBER/CLIN PHARMACIST DOCUMENTED: ICD-10-PCS | Mod: CPTII,95,, | Performed by: INTERNAL MEDICINE

## 2022-04-05 PROCEDURE — 99214 OFFICE O/P EST MOD 30 MIN: CPT | Mod: 95,,, | Performed by: INTERNAL MEDICINE

## 2022-04-05 PROCEDURE — 1159F MED LIST DOCD IN RCRD: CPT | Mod: CPTII,95,, | Performed by: INTERNAL MEDICINE

## 2022-04-05 NOTE — ASSESSMENT & PLAN NOTE
Hx of slightly low testosterone as well as normal testosterone levels with normal libido but still incomplete erections.  Referred to urology for further evaluation.

## 2022-04-05 NOTE — ASSESSMENT & PLAN NOTE
Clinically euthyroid aside from gradual wt gain.  Will check fT4 in 2 weeks and adjust dose of levothyroxine (currently on 75 mcg daily) to keep fT4 in upper half of normal.

## 2022-04-05 NOTE — ASSESSMENT & PLAN NOTE
Currently patient has no symptoms of adrenal insufficiency or hypothyroidism however we reviewed that given history of gamma knife radiosurgery to the sella, the patient is at risk of developing new pituitary hormone deficiencies at any time.  They will let me know if they have symptoms of adrenal insufficiency.  Will plan to screen with yearly labs.

## 2022-04-05 NOTE — PROGRESS NOTES
The patient location is: Home  The chief complaint leading to consultation is: FU pituitary     Visit type: audio only    Face to Face time with patient: 10 minutes of total time spent on the encounter, which includes face to face time and non-face to face time preparing to see the patient (eg, review of tests), Obtaining and/or reviewing separately obtained history, Documenting clinical information in the electronic or other health record, Independently interpreting results (not separately reported) and communicating results to the patient/family/caregiver, or Care coordination (not separately reported).         Each patient to whom he or she provides medical services by telemedicine is:  (1) informed of the relationship between the physician and patient and the respective role of any other health care provider with respect to management of the patient; and (2) notified that he or she may decline to receive medical services by telemedicine and may withdraw from such care at any time.    Notes:   Subjective:   Lucy HUFFMAN, attest that this documentation has been prepared under the direction and in the presence of Charlie Villela MD.     Patient ID: Michael Santiago is a 52 y.o. male     Chief Complaint: No chief complaint on file.          HPI  Mr. Michael Santiago is a 52 y.o. gentleman with a pituitary adenoma, s/p transphenoidal hypophysectomy of tumor on 06/30/2014, and is s/p GSRS (18 Gy to the 50% isodose line) on 10/2/2020, who presents today for 1 year follow up with MRI brain.       Pt was seen in our multidisciplinary clinic with Dr. Verduzco today. Today the patient reports he has been doing well in the interim.  He reports that his energy level is excellent.   No new medical problems or concerns at this time.        Past Medical History:   Diagnosis Date    Cataract     Gout     Headache(784.0)     Hypothyroidism     Normal tension glaucoma of both eyes 11/18/2014    Obesity 7/29/2014    Pituitary  adenoma        Objective:        IMAGING:  MRI Pituitary W W/O Contrast (4/4/2022): Slight interval decrease in size of the hypoenhancing pituitary macro adenoma compared to the study from 01/08/2021.      I have personally reviewed the images with the pt.      I, Dr. Charlie Villela, personally performed the services described in this documentation. All medical record entries made by the scribe, Lucy Alexandre, were at my direction and in my presence.  I have reviewed the chart and agree that the record reflects my personal performance and is accurate and complete. Charlie Villela MD. 04/05/2022    Assessment:       Pituitary tumor.      Plan:   I have personally reviewed the MRI brain with the pt which shows slight interval decrease in size of the hypoenhancing pituitary macro adenoma compared to the study from 01/08/2021.    I will schedule the patient for 1 year follow up with MRI brain.

## 2022-04-05 NOTE — PATIENT INSTRUCTIONS
Symptoms of low cortisol or adrenal insufficiency can be the following:    - unexplained weight loss  - low blood pressure  - lightheadedness  - nausea and/or vomiting  - abdominal pain  - new severe fatigue    If you start experiencing these symptoms please let me know so that I can order and 8 am fasting blood draw to look at your cortisol levels.

## 2022-04-05 NOTE — ASSESSMENT & PLAN NOTE
I have independently reviewed the pituitary MRI from 04/04/2022.   The pituitary stalk is slightly deviated towards with normal pituitary gland in the left aspect of the sella.  There remains a hypoenhancing lesion in the right aspect of the sella with no significant suprasellar extension.  Compared to previous imaging 1 year ago there has been a slight decrease in size.  The left no impingement upon the optic apparatus.        Biochemical evaluation incomplete, only had testosterone level drawn yesterday at 10:30 am with result pending.  Discussed that it if is low we may need to repeat it 8 am fasting with rest of pituitary labs in 2 wks.

## 2022-04-05 NOTE — PATIENT INSTRUCTIONS
I have personally reviewed the MRI brain with the pt which shows slight interval decrease in size of the hypoenhancing pituitary macro adenoma compared to the study from 01/08/2021.    I will schedule the patient for 1 year follow up with MRI brain.

## 2022-04-12 ENCOUNTER — PATIENT MESSAGE (OUTPATIENT)
Dept: NEUROSURGERY | Facility: CLINIC | Age: 52
End: 2022-04-12
Payer: COMMERCIAL

## 2022-04-12 ENCOUNTER — TELEPHONE (OUTPATIENT)
Dept: NEUROSURGERY | Facility: CLINIC | Age: 52
End: 2022-04-12
Payer: COMMERCIAL

## 2022-04-12 NOTE — TELEPHONE ENCOUNTER
Returned Scarlet call. She has stated she has been trying to contact patient regarding his scheduling for the MRI , however she states she has been unsuccessful in her attempts to do so.     I let her know I will do my best to attempt to also contact him and will try to send message thru the portal.   I also thanked her for updating us on status of  scheduling        ----- Message from Lyly Clay sent at 4/12/2022 12:41 PM CDT -----  Contact: Scarlet ([facility])  Scarlet with [facility] requesting call back re: caller states that they have been reaching out to pt and not able to schedule.     Confirmed contact below:  Contact Name:Scarlet  Phone Number: [phone]

## 2022-04-27 ENCOUNTER — PATIENT MESSAGE (OUTPATIENT)
Dept: NEUROSURGERY | Facility: CLINIC | Age: 52
End: 2022-04-27
Payer: COMMERCIAL

## 2022-04-28 ENCOUNTER — LAB VISIT (OUTPATIENT)
Dept: LAB | Facility: HOSPITAL | Age: 52
End: 2022-04-28
Attending: UROLOGY
Payer: COMMERCIAL

## 2022-04-28 ENCOUNTER — OFFICE VISIT (OUTPATIENT)
Dept: UROLOGY | Facility: CLINIC | Age: 52
End: 2022-04-28
Payer: COMMERCIAL

## 2022-04-28 VITALS
WEIGHT: 253.5 LBS | DIASTOLIC BLOOD PRESSURE: 85 MMHG | HEART RATE: 75 BPM | HEIGHT: 73 IN | SYSTOLIC BLOOD PRESSURE: 126 MMHG | BODY MASS INDEX: 33.6 KG/M2

## 2022-04-28 DIAGNOSIS — N40.1 BPH WITH URINARY OBSTRUCTION: ICD-10-CM

## 2022-04-28 DIAGNOSIS — N13.8 BPH WITH URINARY OBSTRUCTION: ICD-10-CM

## 2022-04-28 DIAGNOSIS — N52.01 ERECTILE DYSFUNCTION DUE TO ARTERIAL INSUFFICIENCY: Primary | ICD-10-CM

## 2022-04-28 DIAGNOSIS — D35.2 PITUITARY ADENOMA: ICD-10-CM

## 2022-04-28 PROBLEM — Z92.3 HISTORY OF RADIATION THERAPY: Status: RESOLVED | Noted: 2022-04-05 | Resolved: 2022-04-28

## 2022-04-28 LAB — COMPLEXED PSA SERPL-MCNC: 0.8 NG/ML (ref 0–4)

## 2022-04-28 PROCEDURE — 99204 OFFICE O/P NEW MOD 45 MIN: CPT | Mod: S$GLB,,, | Performed by: UROLOGY

## 2022-04-28 PROCEDURE — 3074F SYST BP LT 130 MM HG: CPT | Mod: CPTII,S$GLB,, | Performed by: UROLOGY

## 2022-04-28 PROCEDURE — 3074F PR MOST RECENT SYSTOLIC BLOOD PRESSURE < 130 MM HG: ICD-10-PCS | Mod: CPTII,S$GLB,, | Performed by: UROLOGY

## 2022-04-28 PROCEDURE — 1160F RVW MEDS BY RX/DR IN RCRD: CPT | Mod: CPTII,S$GLB,, | Performed by: UROLOGY

## 2022-04-28 PROCEDURE — 99999 PR PBB SHADOW E&M-EST. PATIENT-LVL IV: CPT | Mod: PBBFAC,,, | Performed by: UROLOGY

## 2022-04-28 PROCEDURE — 99204 PR OFFICE/OUTPT VISIT, NEW, LEVL IV, 45-59 MIN: ICD-10-PCS | Mod: S$GLB,,, | Performed by: UROLOGY

## 2022-04-28 PROCEDURE — 36415 COLL VENOUS BLD VENIPUNCTURE: CPT | Performed by: UROLOGY

## 2022-04-28 PROCEDURE — 3079F PR MOST RECENT DIASTOLIC BLOOD PRESSURE 80-89 MM HG: ICD-10-PCS | Mod: CPTII,S$GLB,, | Performed by: UROLOGY

## 2022-04-28 PROCEDURE — 1159F MED LIST DOCD IN RCRD: CPT | Mod: CPTII,S$GLB,, | Performed by: UROLOGY

## 2022-04-28 PROCEDURE — 1160F PR REVIEW ALL MEDS BY PRESCRIBER/CLIN PHARMACIST DOCUMENTED: ICD-10-PCS | Mod: CPTII,S$GLB,, | Performed by: UROLOGY

## 2022-04-28 PROCEDURE — 3008F PR BODY MASS INDEX (BMI) DOCUMENTED: ICD-10-PCS | Mod: CPTII,S$GLB,, | Performed by: UROLOGY

## 2022-04-28 PROCEDURE — 84153 ASSAY OF PSA TOTAL: CPT | Performed by: UROLOGY

## 2022-04-28 PROCEDURE — 1159F PR MEDICATION LIST DOCUMENTED IN MEDICAL RECORD: ICD-10-PCS | Mod: CPTII,S$GLB,, | Performed by: UROLOGY

## 2022-04-28 PROCEDURE — 3079F DIAST BP 80-89 MM HG: CPT | Mod: CPTII,S$GLB,, | Performed by: UROLOGY

## 2022-04-28 PROCEDURE — 99999 PR PBB SHADOW E&M-EST. PATIENT-LVL IV: ICD-10-PCS | Mod: PBBFAC,,, | Performed by: UROLOGY

## 2022-04-28 PROCEDURE — 3008F BODY MASS INDEX DOCD: CPT | Mod: CPTII,S$GLB,, | Performed by: UROLOGY

## 2022-04-28 RX ORDER — SILDENAFIL 100 MG/1
100 TABLET, FILM COATED ORAL DAILY PRN
Qty: 10 TABLET | Refills: 11 | Status: SHIPPED | OUTPATIENT
Start: 2022-04-28 | End: 2023-05-16

## 2022-04-28 NOTE — PROGRESS NOTES
CHIEF COMPLAINT:    Mr. Santiago is a 52 y.o. male presenting for a consultation at the request of Dr. Verduzco. Patient presents with ED.    PRESENTING ILLNESS:    Michael Santiago is a 52 y.o. male with a history of a pituitary tumor s/p resection.  Endocrine is managing his pituitary function.    He c/o ED.  He's never tried a PDE-5i.      He has LUTS.  Nocturia x 1.  Is pleased with how he voids.    REVIEW OF SYSTEMS:    Michael Santiago denies headache, blurred vision, fever, nausea, vomiting, chills, abdominal pain, chest pain, sore throat, bleeding per rectum, cough, SOB, recent loss of consciousness, recent mental status changes, seizures, dizziness, or upper or lower extremity weakness.    KO  1. 3  2. 3  3. 3  4. 3  5. 4      PATIENT HISTORY:    Past Medical History:   Diagnosis Date    Cataract     Gout     Headache(784.0)     History of radiation therapy 4/5/2022    Hypothyroidism     Normal tension glaucoma of both eyes 11/18/2014    Obesity 7/29/2014    Pituitary adenoma        Past Surgical History:   Procedure Laterality Date    APPENDECTOMY  2010    TRANSPHENOIDAL PITUITARY RESECTION         Family History   Problem Relation Age of Onset    Stroke Father     Amblyopia Neg Hx     Blindness Neg Hx     Glaucoma Neg Hx     Macular degeneration Neg Hx     Retinal detachment Neg Hx     Strabismus Neg Hx     Cataracts Neg Hx        Social History     Socioeconomic History    Marital status:    Tobacco Use    Smoking status: Current Every Day Smoker     Packs/day: 0.50    Smokeless tobacco: Never Used   Substance and Sexual Activity    Alcohol use: Yes     Comment: social    Drug use: No       Allergies:  Patient has no known allergies.    Medications:    Current Outpatient Medications:     allopurinoL (ZYLOPRIM) 300 MG tablet, Take 1 tablet (300 mg total) by mouth once daily., Disp: 30 tablet, Rfl: 0    fenofibrate (TRICOR) 48 MG tablet, Take 48 mg by mouth once daily., Disp: ,  Rfl:     latanoprost 0.005 % ophthalmic solution, Place 1 drop into both eyes once daily. This replaces the timolol, Disp: 3 mL, Rfl: 3    levothyroxine (SYNTHROID) 75 MCG tablet, Take 1 tablet (75 mcg total) by mouth once daily., Disp: 90 tablet, Rfl: 0    PHYSICAL EXAMINATION:    The patient generally appears in good health, is appropriately interactive, and is in no apparent distress.     Eyes: anicteric sclerae, moist conjunctivae; no lid-lag; PERRLA     HENT: Atraumatic; oropharynx clear with moist mucous membranes and no mucosal ulcerations;normal hard and soft palate.  No evidence of lymphadenopathy.    Neck: Trachea midline.  No thyromegaly.    Skin: No lesions.    Mental: Cooperative with normal affect.  Is oriented to time, place, and person.    Neuro: Grossly intact.    Chest: Normal inspiratory effort.   No accessory muscles.  No audible wheezes.  Respirations symmetric on inspiration and expiration.    Heart: Regular rhythm.      Abdomen:  Soft, non-tender. No masses or organomegaly. Bladder is not palpable. No evidence of flank discomfort. No evidence of inguinal hernia.    Genitourinary: The penis is circumcised with no evidence of plaques or induration. The urethral meatus is normal. The testes, epididymides, and cord structures are normal in size and contour bilaterally. The scrotum is normal in size and contour.    Normal anal sphincter tone. No rectal mass.    The prostate is 45 g. Normal landmarks. Lateral sulci. Median furrow intact.  No nodularity or induration. Seminal vesicles are normal.    Extremities: No clubbing, cyanosis, or edema      LABS:      Lab Results   Component Value Date    PSA 1.1 02/22/2021       IMPRESSION:    Encounter Diagnoses   Name Primary?    Erectile dysfunction due to arterial insufficiency Yes    Pituitary adenoma     BPH with urinary obstruction          PLAN:    1. Will draw a PSA as he's past due.  2. Will defer management of his testosterone levels to  endocrine   3. Discussed options for his ED.  He'd like Viagra. Side effects discussed.  A new Rx was given  4. RTC 3 months to see an FRAN    Copy to: Dax

## 2022-04-29 ENCOUNTER — LAB VISIT (OUTPATIENT)
Dept: LAB | Facility: HOSPITAL | Age: 52
End: 2022-04-29
Attending: INTERNAL MEDICINE
Payer: COMMERCIAL

## 2022-04-29 DIAGNOSIS — D35.2 PITUITARY ADENOMA: ICD-10-CM

## 2022-04-29 LAB
CORTIS SERPL-MCNC: 7.6 UG/DL (ref 4.3–22.4)
LH SERPL-ACNC: 2.7 MIU/ML (ref 0.6–12.1)
PROLACTIN SERPL IA-MCNC: 10.4 NG/ML (ref 3.5–19.4)
T4 FREE SERPL-MCNC: 0.9 NG/DL (ref 0.71–1.51)

## 2022-04-29 PROCEDURE — 84146 ASSAY OF PROLACTIN: CPT | Performed by: INTERNAL MEDICINE

## 2022-04-29 PROCEDURE — 82024 ASSAY OF ACTH: CPT | Performed by: INTERNAL MEDICINE

## 2022-04-29 PROCEDURE — 83002 ASSAY OF GONADOTROPIN (LH): CPT | Performed by: INTERNAL MEDICINE

## 2022-04-29 PROCEDURE — 84439 ASSAY OF FREE THYROXINE: CPT | Performed by: INTERNAL MEDICINE

## 2022-04-29 PROCEDURE — 82533 TOTAL CORTISOL: CPT | Performed by: INTERNAL MEDICINE

## 2022-05-03 ENCOUNTER — LAB VISIT (OUTPATIENT)
Dept: LAB | Facility: HOSPITAL | Age: 52
End: 2022-05-03
Attending: INTERNAL MEDICINE
Payer: COMMERCIAL

## 2022-05-03 DIAGNOSIS — D35.2 PITUITARY ADENOMA: ICD-10-CM

## 2022-05-03 LAB — ACTH PLAS-MCNC: 43 PG/ML (ref 0–46)

## 2022-05-03 PROCEDURE — 84305 ASSAY OF SOMATOMEDIN: CPT | Performed by: INTERNAL MEDICINE

## 2022-05-03 PROCEDURE — 36415 COLL VENOUS BLD VENIPUNCTURE: CPT | Performed by: INTERNAL MEDICINE

## 2022-05-05 LAB
IGF-I SERPL-MCNC: 117 NG/ML (ref 37–245)
IGF-I Z-SCORE SERPL: 0 SD

## 2022-05-25 DIAGNOSIS — E03.8 SECONDARY HYPOTHYROIDISM: ICD-10-CM

## 2022-05-25 RX ORDER — LEVOTHYROXINE SODIUM 75 UG/1
75 TABLET ORAL DAILY
Qty: 90 TABLET | Refills: 3 | Status: SHIPPED | OUTPATIENT
Start: 2022-05-25 | End: 2023-05-30 | Stop reason: SDUPTHER

## 2022-07-01 ENCOUNTER — PATIENT MESSAGE (OUTPATIENT)
Dept: UROLOGY | Facility: CLINIC | Age: 52
End: 2022-07-01
Payer: COMMERCIAL

## 2022-07-12 ENCOUNTER — OFFICE VISIT (OUTPATIENT)
Dept: UROLOGY | Facility: CLINIC | Age: 52
End: 2022-07-12
Payer: COMMERCIAL

## 2022-07-12 ENCOUNTER — OFFICE VISIT (OUTPATIENT)
Dept: OPHTHALMOLOGY | Facility: CLINIC | Age: 52
End: 2022-07-12
Payer: COMMERCIAL

## 2022-07-12 VITALS
HEART RATE: 79 BPM | DIASTOLIC BLOOD PRESSURE: 78 MMHG | SYSTOLIC BLOOD PRESSURE: 125 MMHG | HEIGHT: 73 IN | WEIGHT: 243.81 LBS | BODY MASS INDEX: 32.31 KG/M2

## 2022-07-12 DIAGNOSIS — H40.022 OPEN ANGLE WITH BORDERLINE FINDINGS AND HIGH GLAUCOMA RISK IN LEFT EYE: Primary | ICD-10-CM

## 2022-07-12 DIAGNOSIS — N13.8 BPH WITH URINARY OBSTRUCTION: Primary | ICD-10-CM

## 2022-07-12 DIAGNOSIS — D49.7 PITUITARY TUMOR: ICD-10-CM

## 2022-07-12 DIAGNOSIS — H53.452 NASAL STEP VISUAL FIELD DEFECT OF LEFT EYE: ICD-10-CM

## 2022-07-12 DIAGNOSIS — H40.011 OPEN ANGLE WITH BORDERLINE FINDINGS AND LOW GLAUCOMA RISK IN RIGHT EYE: ICD-10-CM

## 2022-07-12 DIAGNOSIS — N52.01 ERECTILE DYSFUNCTION DUE TO ARTERIAL INSUFFICIENCY: ICD-10-CM

## 2022-07-12 DIAGNOSIS — H35.412 LATTICE DEGENERATION OF PERIPHERAL RETINA, LEFT: ICD-10-CM

## 2022-07-12 DIAGNOSIS — N40.1 BPH WITH URINARY OBSTRUCTION: Primary | ICD-10-CM

## 2022-07-12 LAB
BILIRUB SERPL-MCNC: NORMAL MG/DL
BLOOD URINE, POC: NORMAL
CLARITY, POC UA: CLEAR
COLOR, POC UA: YELLOW
GLUCOSE UR QL STRIP: NORMAL
KETONES UR QL STRIP: NORMAL
LEUKOCYTE ESTERASE URINE, POC: NORMAL
NITRITE, POC UA: NORMAL
PH, POC UA: 5
PROTEIN, POC: NORMAL
SPECIFIC GRAVITY, POC UA: 1.02
UROBILINOGEN, POC UA: NORMAL

## 2022-07-12 PROCEDURE — 99214 OFFICE O/P EST MOD 30 MIN: CPT | Mod: S$GLB,,, | Performed by: NURSE PRACTITIONER

## 2022-07-12 PROCEDURE — 1160F PR REVIEW ALL MEDS BY PRESCRIBER/CLIN PHARMACIST DOCUMENTED: ICD-10-PCS | Mod: CPTII,S$GLB,, | Performed by: OPHTHALMOLOGY

## 2022-07-12 PROCEDURE — 3074F PR MOST RECENT SYSTOLIC BLOOD PRESSURE < 130 MM HG: ICD-10-PCS | Mod: CPTII,S$GLB,, | Performed by: NURSE PRACTITIONER

## 2022-07-12 PROCEDURE — 3074F SYST BP LT 130 MM HG: CPT | Mod: CPTII,S$GLB,, | Performed by: NURSE PRACTITIONER

## 2022-07-12 PROCEDURE — 99999 PR PBB SHADOW E&M-EST. PATIENT-LVL III: ICD-10-PCS | Mod: PBBFAC,,, | Performed by: NURSE PRACTITIONER

## 2022-07-12 PROCEDURE — 99999 PR PBB SHADOW E&M-EST. PATIENT-LVL III: CPT | Mod: PBBFAC,,, | Performed by: OPHTHALMOLOGY

## 2022-07-12 PROCEDURE — 92012 INTRM OPH EXAM EST PATIENT: CPT | Mod: S$GLB,,, | Performed by: OPHTHALMOLOGY

## 2022-07-12 PROCEDURE — 3008F PR BODY MASS INDEX (BMI) DOCUMENTED: ICD-10-PCS | Mod: CPTII,S$GLB,, | Performed by: NURSE PRACTITIONER

## 2022-07-12 PROCEDURE — 1160F PR REVIEW ALL MEDS BY PRESCRIBER/CLIN PHARMACIST DOCUMENTED: ICD-10-PCS | Mod: CPTII,S$GLB,, | Performed by: NURSE PRACTITIONER

## 2022-07-12 PROCEDURE — 81002 URINALYSIS NONAUTO W/O SCOPE: CPT | Mod: S$GLB,,, | Performed by: NURSE PRACTITIONER

## 2022-07-12 PROCEDURE — 81002 POCT URINE DIPSTICK WITHOUT MICROSCOPE: ICD-10-PCS | Mod: S$GLB,,, | Performed by: NURSE PRACTITIONER

## 2022-07-12 PROCEDURE — 1159F PR MEDICATION LIST DOCUMENTED IN MEDICAL RECORD: ICD-10-PCS | Mod: CPTII,S$GLB,, | Performed by: NURSE PRACTITIONER

## 2022-07-12 PROCEDURE — 99999 PR PBB SHADOW E&M-EST. PATIENT-LVL III: CPT | Mod: PBBFAC,,, | Performed by: NURSE PRACTITIONER

## 2022-07-12 PROCEDURE — 99999 PR PBB SHADOW E&M-EST. PATIENT-LVL III: ICD-10-PCS | Mod: PBBFAC,,, | Performed by: OPHTHALMOLOGY

## 2022-07-12 PROCEDURE — 1159F PR MEDICATION LIST DOCUMENTED IN MEDICAL RECORD: ICD-10-PCS | Mod: CPTII,S$GLB,, | Performed by: OPHTHALMOLOGY

## 2022-07-12 PROCEDURE — 1160F RVW MEDS BY RX/DR IN RCRD: CPT | Mod: CPTII,S$GLB,, | Performed by: OPHTHALMOLOGY

## 2022-07-12 PROCEDURE — 1159F MED LIST DOCD IN RCRD: CPT | Mod: CPTII,S$GLB,, | Performed by: NURSE PRACTITIONER

## 2022-07-12 PROCEDURE — 92012 PR EYE EXAM, EST PATIENT,INTERMED: ICD-10-PCS | Mod: S$GLB,,, | Performed by: OPHTHALMOLOGY

## 2022-07-12 PROCEDURE — 92020 GONIOSCOPY: CPT | Mod: S$GLB,,, | Performed by: OPHTHALMOLOGY

## 2022-07-12 PROCEDURE — 99214 PR OFFICE/OUTPT VISIT, EST, LEVL IV, 30-39 MIN: ICD-10-PCS | Mod: S$GLB,,, | Performed by: NURSE PRACTITIONER

## 2022-07-12 PROCEDURE — 1159F MED LIST DOCD IN RCRD: CPT | Mod: CPTII,S$GLB,, | Performed by: OPHTHALMOLOGY

## 2022-07-12 PROCEDURE — 3008F BODY MASS INDEX DOCD: CPT | Mod: CPTII,S$GLB,, | Performed by: NURSE PRACTITIONER

## 2022-07-12 PROCEDURE — 92020 PR SPECIAL EYE EVAL,GONIOSCOPY: ICD-10-PCS | Mod: S$GLB,,, | Performed by: OPHTHALMOLOGY

## 2022-07-12 PROCEDURE — 3078F PR MOST RECENT DIASTOLIC BLOOD PRESSURE < 80 MM HG: ICD-10-PCS | Mod: CPTII,S$GLB,, | Performed by: NURSE PRACTITIONER

## 2022-07-12 PROCEDURE — 1160F RVW MEDS BY RX/DR IN RCRD: CPT | Mod: CPTII,S$GLB,, | Performed by: NURSE PRACTITIONER

## 2022-07-12 PROCEDURE — 3078F DIAST BP <80 MM HG: CPT | Mod: CPTII,S$GLB,, | Performed by: NURSE PRACTITIONER

## 2022-07-12 RX ORDER — FENOFIBRATE 145 MG/1
145 TABLET, FILM COATED ORAL DAILY
COMMUNITY
Start: 2022-07-08

## 2022-07-12 RX ORDER — TADALAFIL 20 MG/1
20 TABLET ORAL DAILY
Qty: 20 TABLET | Refills: 11 | Status: SHIPPED | OUTPATIENT
Start: 2022-07-12 | End: 2023-09-01 | Stop reason: SDUPTHER

## 2022-07-12 NOTE — PROGRESS NOTES
HPI     DLS: 3/07/2022    Pt here for 4 Month Check; (No HVF was available today)  Pt states no eye pain or discomfort.     Meds:  Latanoprost QHS OU     1) LTG Suspect   2) Hx Pituitary Tumor   3) VF Defect OS   4) Hx Dot Heme OU     Last edited by Laura Mireles on 7/12/2022  9:34 AM. (History)            Assessment /Plan     For exam results, see Encounter Report.    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 If aggravated by the latanoprost     LTG suspect - NOT on treatment   2/2 abnl VF test (INS os ) (VF test done 2/2 H/O pit tumor)  Neg FmHx     First HVF   11/2014   First photos   12/2014   Treatment / Drops started   11/2014 // stopped 2/12/2015 - 2/2 eye pain            Family history    neg        Glaucoma meds    Latanoprost started 11/2014 - 2/12/2015 - off 2015 to 2021 -  tolerating again 7/2021         H/O adverse rxn to glaucoma drops - (used timolol without problems - but went back to xalatan - as it is 1 x day)         LASERS    none        GLAUCOMA SURGERIES    none        OTHER EYE SURGERIES    none        CDR    0.7 / 0/75        Tbase    10-11 / 8-9          Tmax    11/9            Ttarget    ?             HVF    5 test 2014 to  2017 - nl od // INS os        Gonio    +3 ou        CCT    480/477 (thin ou)        OCT    3 test 2015 to 2021 - RNFL - dec T, bord N  od // dec. G/TS/T/TI, bord N   os        HRT    2 test 2015 - to 2017 - MR -  Dec. Dec. N/I, bord S/T od // dec. S os /// CDR 0.8 od // 0.7 os        Disc photos    - 2014, 2017  - OIS     - Ttoday  8/8   - Test done today   IOP check on latanoprost       2. Pit tumor    S/P resection    No associated VF loss   Patient  to sees a neurologist     3. VF defect OS   INS    4. H/O Dot heme ou    Minimal - see photos 12/2014 - no H/O diabetes - resolved    PLAN  LTG suspect on basis of VF defect os and ON exam   Hx pituitary tumor with VF defects - confounds HVF    1/29/2021  OD HVF somewhat worse since 2018 but overall not bad  OS HVF with same inf nasal step     - timolol bid ou - appears to have a IAD od presently and some RNFL thinning on past exams(1/2021)   Timolol with good response - but pt would like to re-try latanoprost since it is just 1 x day    If he gets eye pain again (like a few years back) can switch back to timolol - tolerating well     Provided MRx   - gets a free pair every year () 1/29/2021)     Recent worsening of pit tumor - had radiation treatments - oct 2020    Waiting on result of MRI  - seeing the doctor next week to review results     Lattice OS - discussed s/s RD    Photo file updated 4/20/2021     F/U 4 months with OCT ONH OU // 24-2 OU // DFE OU

## 2022-07-12 NOTE — PROGRESS NOTES
CHIEF COMPLAINT:    Mr. Santiago is a 52 y.o. male presenting for ED follow up.      PRESENTING ILLNESS:    Michael Santiago is a 52 y.o. male with a PMH of pituitary tumor, low testosterone, nocturia who presents for erectile dysfunction follow up.    Last seen in urology department by Dr. Thacker 4/28/22 for erectile dysfunction.  Prescribed viagra at that visit and presents today for follow up.  Reports viagra has not helped with erection. Discussed factors that affect erectile dysfunction including low testosterone, obesity, HLD, HTN, DM and smoking.  Discussed ED options including PDE5-i, ICI, JESSENIA and IPP. Would like to try cialis.    He reports a good urinary stream and complete bladder emptying.  No urinary complaints today.    No family history of prostate cancer history. Last PSA 0.88 4/28/22.    REVIEW OF SYSTEMS:    Review of Systems   Constitutional: Negative for chills and fever.   Respiratory: Negative for shortness of breath.    Cardiovascular: Negative for chest pain.   Gastrointestinal: Negative for constipation and diarrhea.   Genitourinary: Negative for dysuria, flank pain, frequency, hematuria and urgency.   Neurological: Negative for dizziness and weakness.       PATIENT HISTORY:    Past Medical History:   Diagnosis Date    Cataract     Gout     Headache(784.0)     History of radiation therapy 4/5/2022    Hypothyroidism     Normal tension glaucoma of both eyes 11/18/2014    Obesity 7/29/2014    Pituitary adenoma        Family History   Problem Relation Age of Onset    Stroke Father     Amblyopia Neg Hx     Blindness Neg Hx     Glaucoma Neg Hx     Macular degeneration Neg Hx     Retinal detachment Neg Hx     Strabismus Neg Hx     Cataracts Neg Hx        Allergies:  Patient has no known allergies.    Medications:    Current Outpatient Medications:     allopurinoL (ZYLOPRIM) 300 MG tablet, Take 1 tablet (300 mg total) by mouth once daily., Disp: 30 tablet, Rfl: 0    fenofibrate  (TRICOR) 145 MG tablet, Take 145 mg by mouth once daily., Disp: , Rfl:     latanoprost 0.005 % ophthalmic solution, Place 1 drop into both eyes once daily. This replaces the timolol, Disp: 3 mL, Rfl: 3    levothyroxine (SYNTHROID) 75 MCG tablet, Take 1 tablet (75 mcg total) by mouth once daily., Disp: 90 tablet, Rfl: 3    sildenafiL (VIAGRA) 100 MG tablet, Take 1 tablet (100 mg total) by mouth daily as needed for Erectile Dysfunction. Dispense generic, Disp: 10 tablet, Rfl: 11    fenofibrate (TRICOR) 48 MG tablet, Take 48 mg by mouth once daily., Disp: , Rfl:     tadalafiL (CIALIS) 20 MG Tab, Take 1 tablet (20 mg total) by mouth once daily., Disp: 20 tablet, Rfl: 11    PHYSICAL EXAMINATION:      Physical Exam  Vitals and nursing note reviewed.   Constitutional:       Appearance: Normal appearance. He is well-developed.   HENT:      Head: Normocephalic and atraumatic.   Eyes:      Pupils: Pupils are equal, round, and reactive to light.   Pulmonary:      Effort: Pulmonary effort is normal.   Musculoskeletal:         General: Normal range of motion.      Cervical back: Normal range of motion.   Skin:     General: Skin is warm and dry.   Neurological:      Mental Status: He is alert and oriented to person, place, and time.   Psychiatric:         Behavior: Behavior normal.           LABS:    U/a performed in office today: yellow, ph 5, 1.025, otherwise unremarkable    Lab Results   Component Value Date    PSA 1.1 02/22/2021    PSADIAG 0.80 04/28/2022       IMPRESSION:  Encounter Diagnoses   Name Primary?    BPH with urinary obstruction Yes    Erectile dysfunction due to arterial insufficiency          PLAN:  Problem List Items Addressed This Visit     Erectile dysfunction    Relevant Medications    tadalafiL (CIALIS) 20 MG Tab    BPH with urinary obstruction - Primary    Relevant Orders    POCT URINE DIPSTICK WITHOUT MICROSCOPE (Completed)          1. Erectile dysfunction   Trial of Cialis.  Discussed side effects  including but not limited to myalgia, headaches and priapism.  He voiced understanding.     2. bph without obstruction   Reviewed PSA, wnl  3. RTC in 1 yr or sooner prn    I spent 30 minutes with the patient. Over 50% of the visit was spent in counseling.      Alesia Loredo NP

## 2022-11-14 ENCOUNTER — CLINICAL SUPPORT (OUTPATIENT)
Dept: OPHTHALMOLOGY | Facility: CLINIC | Age: 52
End: 2022-11-14
Payer: COMMERCIAL

## 2022-11-14 ENCOUNTER — OFFICE VISIT (OUTPATIENT)
Dept: OPHTHALMOLOGY | Facility: CLINIC | Age: 52
End: 2022-11-14
Payer: COMMERCIAL

## 2022-11-14 DIAGNOSIS — H40.022 OPEN ANGLE WITH BORDERLINE FINDINGS AND HIGH GLAUCOMA RISK IN LEFT EYE: Primary | ICD-10-CM

## 2022-11-14 DIAGNOSIS — H53.452 NASAL STEP VISUAL FIELD DEFECT OF LEFT EYE: ICD-10-CM

## 2022-11-14 DIAGNOSIS — H35.412 LATTICE DEGENERATION OF PERIPHERAL RETINA, LEFT: ICD-10-CM

## 2022-11-14 DIAGNOSIS — D49.7 PITUITARY TUMOR: ICD-10-CM

## 2022-11-14 DIAGNOSIS — H40.011 OPEN ANGLE WITH BORDERLINE FINDINGS AND LOW GLAUCOMA RISK IN RIGHT EYE: ICD-10-CM

## 2022-11-14 PROCEDURE — 92014 PR EYE EXAM, EST PATIENT,COMPREHESV: ICD-10-PCS | Mod: S$GLB,,, | Performed by: STUDENT IN AN ORGANIZED HEALTH CARE EDUCATION/TRAINING PROGRAM

## 2022-11-14 PROCEDURE — 99999 PR PBB SHADOW E&M-EST. PATIENT-LVL II: ICD-10-PCS | Mod: PBBFAC,,, | Performed by: STUDENT IN AN ORGANIZED HEALTH CARE EDUCATION/TRAINING PROGRAM

## 2022-11-14 PROCEDURE — 1159F MED LIST DOCD IN RCRD: CPT | Mod: CPTII,S$GLB,, | Performed by: STUDENT IN AN ORGANIZED HEALTH CARE EDUCATION/TRAINING PROGRAM

## 2022-11-14 PROCEDURE — 99999 PR PBB SHADOW E&M-EST. PATIENT-LVL II: CPT | Mod: PBBFAC,,, | Performed by: STUDENT IN AN ORGANIZED HEALTH CARE EDUCATION/TRAINING PROGRAM

## 2022-11-14 PROCEDURE — 1159F PR MEDICATION LIST DOCUMENTED IN MEDICAL RECORD: ICD-10-PCS | Mod: CPTII,S$GLB,, | Performed by: STUDENT IN AN ORGANIZED HEALTH CARE EDUCATION/TRAINING PROGRAM

## 2022-11-14 PROCEDURE — 92014 COMPRE OPH EXAM EST PT 1/>: CPT | Mod: S$GLB,,, | Performed by: STUDENT IN AN ORGANIZED HEALTH CARE EDUCATION/TRAINING PROGRAM

## 2022-11-14 NOTE — PROGRESS NOTES
OCT DONE OU      24-2  SS DONE OU     REL & FIX ==   GOOD OU       COOP=      GOOD     PATIENT DENIES ANY ALLERGIES TO LATEX OR ADHESIVES AT THIS TIME    JTHOMAS      MRX     -1.25    OD     -1.00 + .25 X 174   OS

## 2022-11-14 NOTE — PROGRESS NOTES
Assessment /Plan     For exam results, see Encounter Report.    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 If aggravated by the latanoprost     LTG suspect - NOT on treatment   2/2 abnl VF test (INS os ) (VF test done 2/2 H/O pit tumor)  Neg FmHx     First HVF   11/2014   First photos   12/2014   Treatment / Drops started   11/2014 // stopped 2/12/2015 - 2/2 eye pain            Family history    neg        Glaucoma meds    Latanoprost started 11/2014 - 2/12/2015 - off 2015 to 2021 -  tolerating again 7/2021         H/O adverse rxn to glaucoma drops - (used timolol without problems - but went back to xalatan - as it is 1 x day)         LASERS    none        GLAUCOMA SURGERIES    none        OTHER EYE SURGERIES    none        CDR    0.7 / 0/75        Tbase    10-11 / 8-9          Tmax    11/9            Ttarget    ?             HVF    5 test 2014 to  2017 - nl od // INS os        Gonio    +3 ou        CCT    480/477 (thin ou)        OCT    3 test 2015 to 2021 - RNFL - dec T, bord N  od // dec. G/TS/T/TI, bord N   os        HRT    2 test 2015 - to 2017 - MR -  Dec. Dec. N/I, bord S/T od // dec. S os /// CDR 0.8 od // 0.7 os        Disc photos    - 2014, 2017  - OIS     - Ttoday  9//8  - Test done today   IOP OCT RNFL HVF DFE      2. Pit tumor    S/P resection    No associated VF loss   Patient to sees a neurologist     3. VF defect OS   INS    4. H/O Dot heme ou    Minimal - see photos 12/2014 - no H/O diabetes - resolved    PLAN  LTG suspect on basis of VF defect os and ON exam   Hx pituitary tumor with VF defects - confounds HVF    1/29/2021  OD HVF somewhat worse since 2018 but overall not  bad  OS HVF with same inf nasal step     - timolol bid ou - appears to have a IAD od presently and some RNFL thinning on past exams(1/2021)   Timolol with good response - but pt would like to re-try latanoprost since it is just 1 x day    If he gets eye pain again (like a few years back) can switch back to timolol - tolerating well     Provided MRx   - gets a free pair every year () 1/29/2021)     Recent worsening of pit tumor - had radiation treatments - oct 2020    Waiting on result of MRI  - seeing the doctor next week to review results     Lattice OS - discussed s/s RD    Photo file updated 4/20/2021 11/14/2022  OCT RNFL - borderline stable (possible mild progression, 1-2 poitns overall and temp)- glaucomatous thinning OU   HVF: full// SNS and INS mild- OD and OS stable   DFE- stable     F/U 4 months with iop

## 2023-01-12 NOTE — PROGRESS NOTES
HPI     Glaucoma     Additional comments: HVF and OCT review today and pt states no changes   since last exam            Comments    DLS: 7/12/22    1) LTG Suspect  2) Hx Pituitary Tumor  3) VF Defect OS  4) Dot Heme OU    MEDS:  Latanoprost QHS OU          Last edited by Анна Langley MA on 11/14/2022  2:05 PM.            Assessment /Plan     For exam results, see Encounter Report.    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     Zachary Davis MD MS [facility]

## 2023-03-22 ENCOUNTER — PATIENT MESSAGE (OUTPATIENT)
Dept: NEUROSURGERY | Facility: CLINIC | Age: 53
End: 2023-03-22
Payer: COMMERCIAL

## 2023-03-22 DIAGNOSIS — D49.7 PITUITARY TUMOR: Primary | ICD-10-CM

## 2023-03-22 DIAGNOSIS — E89.3 STATUS POST TRANSSPHENOIDAL PITUITARY RESECTION: ICD-10-CM

## 2023-03-23 ENCOUNTER — TELEPHONE (OUTPATIENT)
Dept: NEUROSURGERY | Facility: CLINIC | Age: 53
End: 2023-03-23
Payer: COMMERCIAL

## 2023-05-08 ENCOUNTER — PATIENT MESSAGE (OUTPATIENT)
Dept: ENDOCRINOLOGY | Facility: CLINIC | Age: 53
End: 2023-05-08
Payer: COMMERCIAL

## 2023-05-08 DIAGNOSIS — Z92.3 HISTORY OF RADIATION THERAPY: Primary | ICD-10-CM

## 2023-05-08 DIAGNOSIS — R79.89 LOW TESTOSTERONE IN MALE: ICD-10-CM

## 2023-05-09 NOTE — TELEPHONE ENCOUNTER
Previously ordered labs seem to have , reordered.  Please schedule 8 am fasting labs 2 weeks before visit me.    - ACTH; Future  - Cortisol, 8AM; Future  - Luteinizing Hormone; Future  - Insulin-Like Growth Factor; Future  - Prolactin; Future  - T4, Free; Future  - Testosterone Panel; Future      Alice Verduzco MD

## 2023-05-15 NOTE — PROGRESS NOTES
HPI    DLS: 11/14/2022    Pt here for 4 Month Check;  Pt states at times it feels like something is in. Pt states he does use   AT's.     Meds:  Latanoprost QHS OU    1) LTG Suspect   2) Hx Pituitary Tumor   3) VF Defect OS   4) Dot Heme OU    Last edited by Laura Mireles on 5/16/2023  1:32 PM.              Assessment /Plan     For exam results, see Encounter Report.    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 If aggravated by the latanoprost     LTG suspect - NOT on treatment   2/2 abnl VF test (INS os ) (VF test done 2/2 H/O pit tumor)  Neg FmHx     First HVF   11/2014   First photos   12/2014   Treatment / Drops started   11/2014 // stopped 2/12/2015 - 2/2 eye pain            Family history    neg        Glaucoma meds    Latanoprost started 11/2014 - 2/12/2015 - off 2015 to 2021 -  tolerating again 7/2021         H/O adverse rxn to glaucoma drops - (used timolol without problems - but went back to xalatan - as it is 1 x day)         LASERS    none        GLAUCOMA SURGERIES    none        OTHER EYE SURGERIES    none        CDR    0.7 / 0/75        Tbase    10-11 / 8-9          Tmax    11/9            Ttarget    ?             HVF    5 test 2014 to  2017 - nl od // INS os        Gonio    +3 ou        CCT    480/477 (thin ou)        OCT    3 test 2015 to 2021 - RNFL - dec T, bord N  od // dec. G/TS/T/TI, bord N   os        HRT    2 test 2015 - to 2017 - MR -  Dec. Dec. N/I, bord S/T od // dec. S os /// CDR 0.8 od // 0.7 os        Disc photos    - 2014, 2017  - OIS     - Ttoday  10/8  - Test done today   IOP  / gonio / Rx glasses       2. Pit tumor    S/P resection    No associated VF loss   Patient  to sees a neurologist     3. VF defect OS   INS    4. H/O Dot heme ou    Minimal - see photos 12/2014 - no H/O diabetes - resolved    PLAN  LTG suspect on basis of VF defect os and ON exam   Hx pituitary tumor with VF defects - confounds HVF    1/29/2021  OD HVF somewhat worse since 2018 but overall not bad  OS HVF with same inf nasal step     - timolol bid ou - appears to have a IAD od presently and some RNFL thinning on past exams(1/2021)   Timolol with good response - but pt would like to re-try latanoprost since it is just 1 x day    If he gets eye pain again (like a few years back) can switch back to timolol - tolerating well     Provided MRx   - gets a free pair every year (5/16/2023)     Recent worsening of pit tumor - had radiation treatments - oct 2020    Waiting on result of MRI  - seeing the doctor next week to review results     Lattice OS - discussed s/s RD    Photo file updated 5/16/2023    F/U 6 months with IOP // HVF // DFE

## 2023-05-16 ENCOUNTER — OFFICE VISIT (OUTPATIENT)
Dept: NEUROSURGERY | Facility: CLINIC | Age: 53
End: 2023-05-16
Payer: COMMERCIAL

## 2023-05-16 ENCOUNTER — HOSPITAL ENCOUNTER (OUTPATIENT)
Dept: RADIOLOGY | Facility: HOSPITAL | Age: 53
Discharge: HOME | End: 2023-05-16
Attending: NEUROLOGICAL SURGERY
Payer: COMMERCIAL

## 2023-05-16 ENCOUNTER — OFFICE VISIT (OUTPATIENT)
Dept: OPHTHALMOLOGY | Facility: CLINIC | Age: 53
End: 2023-05-16
Payer: COMMERCIAL

## 2023-05-16 VITALS — SYSTOLIC BLOOD PRESSURE: 131 MMHG | TEMPERATURE: 97 F | HEART RATE: 83 BPM | DIASTOLIC BLOOD PRESSURE: 95 MMHG

## 2023-05-16 DIAGNOSIS — H40.011 OPEN ANGLE WITH BORDERLINE FINDINGS AND LOW GLAUCOMA RISK IN RIGHT EYE: ICD-10-CM

## 2023-05-16 DIAGNOSIS — H35.412 LATTICE DEGENERATION OF PERIPHERAL RETINA, LEFT: ICD-10-CM

## 2023-05-16 DIAGNOSIS — D49.7 PITUITARY TUMOR: ICD-10-CM

## 2023-05-16 DIAGNOSIS — D49.7 PITUITARY TUMOR: Primary | ICD-10-CM

## 2023-05-16 DIAGNOSIS — H40.022 OPEN ANGLE WITH BORDERLINE FINDINGS AND HIGH GLAUCOMA RISK IN LEFT EYE: Primary | ICD-10-CM

## 2023-05-16 DIAGNOSIS — H53.452 NASAL STEP VISUAL FIELD DEFECT OF LEFT EYE: ICD-10-CM

## 2023-05-16 DIAGNOSIS — E89.3 STATUS POST TRANSSPHENOIDAL PITUITARY RESECTION: ICD-10-CM

## 2023-05-16 PROCEDURE — 92020 PR SPECIAL EYE EVAL,GONIOSCOPY: ICD-10-PCS | Mod: S$GLB,,, | Performed by: OPHTHALMOLOGY

## 2023-05-16 PROCEDURE — 70553 MRI BRAIN STEM W/O & W/DYE: CPT | Mod: 26,,, | Performed by: RADIOLOGY

## 2023-05-16 PROCEDURE — 1159F PR MEDICATION LIST DOCUMENTED IN MEDICAL RECORD: ICD-10-PCS | Mod: CPTII,S$GLB,, | Performed by: OPHTHALMOLOGY

## 2023-05-16 PROCEDURE — 99999 PR PBB SHADOW E&M-EST. PATIENT-LVL III: CPT | Mod: PBBFAC,,, | Performed by: NEUROLOGICAL SURGERY

## 2023-05-16 PROCEDURE — 99999 PR PBB SHADOW E&M-EST. PATIENT-LVL III: ICD-10-PCS | Mod: PBBFAC,,, | Performed by: OPHTHALMOLOGY

## 2023-05-16 PROCEDURE — 1159F PR MEDICATION LIST DOCUMENTED IN MEDICAL RECORD: ICD-10-PCS | Mod: CPTII,S$GLB,, | Performed by: NEUROLOGICAL SURGERY

## 2023-05-16 PROCEDURE — 3080F DIAST BP >= 90 MM HG: CPT | Mod: CPTII,S$GLB,, | Performed by: NEUROLOGICAL SURGERY

## 2023-05-16 PROCEDURE — A9585 GADOBUTROL INJECTION: HCPCS | Performed by: NEUROLOGICAL SURGERY

## 2023-05-16 PROCEDURE — 99999 PR PBB SHADOW E&M-EST. PATIENT-LVL III: ICD-10-PCS | Mod: PBBFAC,,, | Performed by: NEUROLOGICAL SURGERY

## 2023-05-16 PROCEDURE — 1160F RVW MEDS BY RX/DR IN RCRD: CPT | Mod: CPTII,S$GLB,, | Performed by: OPHTHALMOLOGY

## 2023-05-16 PROCEDURE — 99214 OFFICE O/P EST MOD 30 MIN: CPT | Mod: S$GLB,,, | Performed by: OPHTHALMOLOGY

## 2023-05-16 PROCEDURE — 99214 PR OFFICE/OUTPT VISIT, EST, LEVL IV, 30-39 MIN: ICD-10-PCS | Mod: S$GLB,,, | Performed by: NEUROLOGICAL SURGERY

## 2023-05-16 PROCEDURE — 1160F PR REVIEW ALL MEDS BY PRESCRIBER/CLIN PHARMACIST DOCUMENTED: ICD-10-PCS | Mod: CPTII,S$GLB,, | Performed by: OPHTHALMOLOGY

## 2023-05-16 PROCEDURE — 99214 PR OFFICE/OUTPT VISIT, EST, LEVL IV, 30-39 MIN: ICD-10-PCS | Mod: S$GLB,,, | Performed by: OPHTHALMOLOGY

## 2023-05-16 PROCEDURE — 70553 MRI BRAIN STEM W/O & W/DYE: CPT | Mod: TC

## 2023-05-16 PROCEDURE — 1160F PR REVIEW ALL MEDS BY PRESCRIBER/CLIN PHARMACIST DOCUMENTED: ICD-10-PCS | Mod: CPTII,S$GLB,, | Performed by: NEUROLOGICAL SURGERY

## 2023-05-16 PROCEDURE — 1160F RVW MEDS BY RX/DR IN RCRD: CPT | Mod: CPTII,S$GLB,, | Performed by: NEUROLOGICAL SURGERY

## 2023-05-16 PROCEDURE — 25500020 PHARM REV CODE 255: Performed by: NEUROLOGICAL SURGERY

## 2023-05-16 PROCEDURE — 3080F PR MOST RECENT DIASTOLIC BLOOD PRESSURE >= 90 MM HG: ICD-10-PCS | Mod: CPTII,S$GLB,, | Performed by: NEUROLOGICAL SURGERY

## 2023-05-16 PROCEDURE — 3075F PR MOST RECENT SYSTOLIC BLOOD PRESS GE 130-139MM HG: ICD-10-PCS | Mod: CPTII,S$GLB,, | Performed by: NEUROLOGICAL SURGERY

## 2023-05-16 PROCEDURE — 92020 GONIOSCOPY: CPT | Mod: S$GLB,,, | Performed by: OPHTHALMOLOGY

## 2023-05-16 PROCEDURE — 70553 MRI PITUITARY W W/O CONTRAST: ICD-10-PCS | Mod: 26,,, | Performed by: RADIOLOGY

## 2023-05-16 PROCEDURE — 1159F MED LIST DOCD IN RCRD: CPT | Mod: CPTII,S$GLB,, | Performed by: NEUROLOGICAL SURGERY

## 2023-05-16 PROCEDURE — 1159F MED LIST DOCD IN RCRD: CPT | Mod: CPTII,S$GLB,, | Performed by: OPHTHALMOLOGY

## 2023-05-16 PROCEDURE — 99214 OFFICE O/P EST MOD 30 MIN: CPT | Mod: S$GLB,,, | Performed by: NEUROLOGICAL SURGERY

## 2023-05-16 PROCEDURE — 99999 PR PBB SHADOW E&M-EST. PATIENT-LVL III: CPT | Mod: PBBFAC,,, | Performed by: OPHTHALMOLOGY

## 2023-05-16 PROCEDURE — 3075F SYST BP GE 130 - 139MM HG: CPT | Mod: CPTII,S$GLB,, | Performed by: NEUROLOGICAL SURGERY

## 2023-05-16 RX ORDER — GADOBUTROL 604.72 MG/ML
5 INJECTION INTRAVENOUS
Status: COMPLETED | OUTPATIENT
Start: 2023-05-16 | End: 2023-05-16

## 2023-05-16 RX ADMIN — GADOBUTROL 5 ML: 604.72 INJECTION INTRAVENOUS at 10:05

## 2023-05-16 NOTE — PROGRESS NOTES
Subjective:   I, Debbie Bradford, attest that this documentation has been prepared under the direction and in the presence of Charlie Villela MD.     Patient ID: Michael Santiago is a 53 y.o. male     Chief Complaint: No chief complaint on file.      HPI  Mr. Michael Santiago is a 53 y.o. gentleman with a pituitary adenoma, s/p transphenoidal hypophysectomy of tumor on 06/30/2014, and is s/p GSRS (18 Gy to the 50% isodose line) on 10/2/2020, who presents today for 1 year follow up with MRI brain. At the time of our last clinic visit on 4/5/2022, the patient reports he has been doing well in the interim.  He reports that his energy level is excellent.   No new medical problems or concerns at this time.     Today the pt reports he continues to do well in the interim. He has been maintaining his activity levels and working as normal. No change since our last visit. Good performance status. ECOG 0.    Review of Systems   Constitutional:  Negative for activity change, appetite change, fatigue, fever and unexpected weight change.   HENT:  Negative for facial swelling.    Eyes: Negative.    Respiratory: Negative.     Cardiovascular: Negative.    Gastrointestinal:  Negative for diarrhea, nausea and vomiting.   Endocrine: Negative.    Genitourinary: Negative.    Musculoskeletal:  Negative for back pain, joint swelling, myalgias and neck pain.   Neurological:  Negative for dizziness, seizures, weakness, numbness and headaches.   Psychiatric/Behavioral: Negative.        Past Medical History:   Diagnosis Date    Cataract     Gout     Headache(784.0)     History of radiation therapy 4/5/2022    Hypothyroidism     Normal tension glaucoma of both eyes 11/18/2014    Obesity 7/29/2014    Pituitary adenoma        Objective:      Vitals:    05/16/23 1121   BP: (!) 131/95   Pulse: 83   Temp: 97.1 °F (36.2 °C)      Physical Exam  Constitutional:       General: He is not in acute distress.     Appearance: Normal appearance.   HENT:      Head:  Normocephalic and atraumatic.   Pulmonary:      Effort: Pulmonary effort is normal.   Musculoskeletal:      Cervical back: Neck supple.   Neurological:      Mental Status: He is alert and oriented to person, place, and time.      GCS: GCS eye subscore is 4. GCS verbal subscore is 5. GCS motor subscore is 6.      Cranial Nerves: No cranial nerve deficit.       IMAGING:  MRI Pituitary W WO Contrast (5/16/2023):  Slight interval decrease in size of the hypoenhancing pituitary macro adenoma     I have personally reviewed the images with the pt.      I, Dr. Charlie Villela, personally performed the services described in this documentation. All medical record entries made by the scribe, Debbie Bradford, were at my direction and in my presence.  I have reviewed the chart and agree that the record reflects my personal performance and is accurate and complete. Charlie Villela MD. 05/16/2023    Assessment:       Pituitary adenoma.      Plan:   I have personally reviewed the MRI pituitary with the pt which shows slight interval decrease in size of the hypoenhancing pituitary macro adenoma     I will schedule the patient for 1 year follow up with MRI pituitary.

## 2023-05-16 NOTE — PATIENT INSTRUCTIONS
I have personally reviewed the MRI pituitary with the pt which shows slight interval decrease in size of the hypoenhancing pituitary macro adenoma     I will schedule the patient for 1 year follow up with MRI pituitary.

## 2023-05-30 DIAGNOSIS — E03.8 SECONDARY HYPOTHYROIDISM: ICD-10-CM

## 2023-05-30 RX ORDER — LEVOTHYROXINE SODIUM 75 UG/1
75 TABLET ORAL DAILY
Qty: 90 TABLET | Refills: 3 | Status: SHIPPED | OUTPATIENT
Start: 2023-05-30 | End: 2023-07-10

## 2023-06-26 ENCOUNTER — PATIENT MESSAGE (OUTPATIENT)
Dept: ENDOCRINOLOGY | Facility: CLINIC | Age: 53
End: 2023-06-26
Payer: COMMERCIAL

## 2023-06-26 ENCOUNTER — TELEPHONE (OUTPATIENT)
Dept: ENDOCRINOLOGY | Facility: CLINIC | Age: 53
End: 2023-06-26
Payer: COMMERCIAL

## 2023-06-26 NOTE — PROGRESS NOTES
Pituitary Clinic Follow-Up  06/27/2023    The patient's last visit with Dr Verduzco was on 4/5/2022.     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    History of Present Illness  Michael Santiago  Is a 53 y.o. man who is s/p TSR 6/30/14 with Dr. 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  preop eval indicated non-functional adenoma, path report w/o staining reports only adenoma.  He  Does have some residual tumor in the right side of the sella, being followed w/ MRI, found to have growth in 8/2020 so he was treated w/ GKRS in 10/2020.     Initial presentation: to ent dizziness ha and neck pain   Found to have a macroadenoma - nonfunctional    Interval hx:  Did not have a chance to get his labs done prior to today's visit. Otherwise he is doing well.  He denies symptoms of adrenal insufficiency (lightheadedness, N/V/abd pain, hypotension).   No unexplained wt loss - current wt on home scale 250 lbs (gradual increase over last couple years) but stable more recently.  No HA or vision change (follows with eye doctor for glaucoma every 6 month(s)-next due in September 2023.    Has seen urology for ED -- tried viagra, did not work. Now on Cialis, also not really working well -- does not take it often. Reports normal libido but incomplete erections.    Imaging:  MRI Pituitary 5/16/2023  There is continued heterogeneous T2 hyperintensity and hypoenhancement within the right adenohypophysis this measures approximately 1.0 cm TV and and 0.8 cm craniocaudal previously measuring 1.2 x 0.8 cm.  No evidence for new lesion or increased size lesion.  No mass effect on the suprasellar neurovascular structures..      Regarding secondary Hypothyroidism:  On 75 mcg daily    Takes first thing in the morning 30 minutes prior to meal    Lab Results   Component Value Date    TSH 0.824 03/22/2018    FREET4 0.90 04/29/2022        Thyroid Symptoms  Normal energy    Weight change:  []  Gain []  Loss  [x]  Denies    gradual trend up    Temperature intolerance:  []  Cold []  Hot   [x]  Denies     GI:  []  Diarrhea []  Constipation [x]  Denies    Integument:  []  Hair loss []  Dry skin  [x]  Denies    Other:  []  Palpitation []  tremor     []  Increased anxiety    [x]  Denies      PSA, Screen (ng/mL)   Date Value   2021 1.1     Imagin2023:  Sella: Pituitary gland is stable in height.  There is continued heterogeneous T2 hyperintensity and hypoenhancement within the right adenohypophysis this measures approximately 1.0 cm TV and and 0.8 cm craniocaudal previously measuring 1.2 x 0.8 cm.  No evidence for new lesion or increased size lesion.  No mass effect on the suprasellar neurovascular structures..    MR sella: Continued though slightly reduced sized heterogeneous hypoenhancing lesion in the right aspect of the sella suggestive for evolving pituitary adenoma.  No evidence for increased size lesion or mass effect on the suprasellar neurovascular structures.  Clinical correlation and continued follow-up advised.    2022:   Sella: The known hypoenhancing focus within the right aspect of the adenohypophysis is slightly smaller than on the study from 2021.  It measures 8 mm craniocaudal by 12 mm transverse compared to prior 11 mm craniocaudal by 12 mm transverse.  There is diminished suprasellar extension with persistent deviation of the infundibulum toward the left.    2020  Postsurgical changes from prior pituitary macroadenoma resection.  Hypoenhancing mass in the right side of the pituitary gland which measures approximately 1.6 x 0.9 x 1.3 cm, increased from 1.1 x 0.8 x 1.0 cm (2018) and 0.9 x 0.8 x 0.5 cm (2016).  Persistent mild leftward infundibular displacement.  No significant mass effect on the optic chiasm or adjacent internal carotid artery.     18  MRI - hypoenhancing mass in right side  "of the pituitary gland measuring 11 x 8 by 10 mm.  This has increased in size slightly by around 2 mm compared with the prior study 08/02/2016.  There is minimal displacement of the optic chiasm insertion to the left.      Mri 10/31/14  1. History of prior pituitary neoplasm resection with some heterogeneous enhancement to the right of midline as described above which could represent residual pituitary neoplasm versus postsurgical change. Clinical correlation recommended. Follow can   be obtained to document stability.      Current Outpatient Medications:     allopurinoL (ZYLOPRIM) 300 MG tablet, Take 1 tablet (300 mg total) by mouth once daily., Disp: 30 tablet, Rfl: 0    fenofibrate (TRICOR) 145 MG tablet, Take 145 mg by mouth once daily., Disp: , Rfl:     latanoprost 0.005 % ophthalmic solution, INSTILL 1 DROP IN EACH EYE EVERY DAY (REPLACING TIMOLOL), Disp: 7.5 mL, Rfl: 3    levothyroxine (SYNTHROID) 75 MCG tablet, Take 1 tablet (75 mcg total) by mouth once daily., Disp: 90 tablet, Rfl: 3    sildenafiL (VIAGRA) 100 MG tablet, Take 1 tablet (100 mg total) by mouth daily as needed for Erectile Dysfunction. Dispense generic, Disp: 10 tablet, Rfl: 11    tadalafiL (CIALIS) 20 MG Tab, Take 1 tablet (20 mg total) by mouth once daily., Disp: 20 tablet, Rfl: 11   ROS: see hpi    Objective:   Body mass index is 33.27 kg/m².  Blood pressure 111/76, pulse 90, height 6' 1" (1.854 m), weight 114.4 kg (252 lb 3.3 oz), SpO2 98 %.  Constitutional:          Pleasant,  in no acute distress.   HENT:   Eyes:                          No scleral icterus.   Respiratory:               Effort normal   Neurological:             normal speech  Psych:                        Normal mood and affect.             Lab Review:   Lab Results   Component Value Date    HGBA1C 5.2 08/03/2020     Lab Results   Component Value Date    CHOL 131 08/03/2020    HDL 40 08/03/2020    LDLCALC 43.0 (L) 08/03/2020    TRIG 240 (H) 08/03/2020    CHOLHDL 30.5 " 08/03/2020     Lab Results   Component Value Date     02/03/2021    K 4.6 02/03/2021     02/03/2021    CO2 23 02/03/2021    GLU 97 02/03/2021    BUN 19 02/03/2021    CREATININE 1.2 02/03/2021    CALCIUM 9.1 02/03/2021    PROT 7.5 02/03/2021    ALBUMIN 4.3 04/04/2022    BILITOT 0.4 02/03/2021    ALKPHOS 82 02/03/2021    AST 35 02/03/2021    ALT 59 (H) 02/03/2021    ANIONGAP 10 02/03/2021    ESTGFRAFRICA >60 02/03/2021    EGFRNONAA >60 02/03/2021    TSH 0.824 03/22/2018       Pituitary MRI 5/16/2023:      Pituitary MRI 4/4/22      Assessment and Plan       Problem List Items Addressed This Visit          Renal/    Erectile dysfunction     Follows with urology for ED            Endocrine    Pituitary adenoma - Primary     Imaging it stable. I have independently reviewed the pituitary MRI from 5/16/2023 and there has been no increase in size of lesion and no new lesions.   No HA or vision changes.  On levothyroxine for secondary hypothyroidism. No s/sx of AI. Has ED and follows with urology. Not on testosterone replacement.   Needs fasting 8am pituitary labs now: testotsterone panel, fT4, PRL, IGF1, LH, 8am cortisol, ACTH   Reviewed s/sx of AI and instructed pt to notify us right away if he develops sx          Relevant Orders    ACTH    Cortisol, 8AM    Insulin-Like Growth Factor    Luteinizing Hormone    T4, Free    Prolactin    TESTOSTERONE PANEL    Secondary hypothyroidism     Clinically euthyroid.  Will check fT4 and adjust dose of levothyroxine (currently on 75 mcg daily) to keep fT4 in upper half of normal.   He needs to have labs done.         Low testosterone in male     Hx of slightly low testosterone as well as normal testosterone levels with normal libido but still incomplete erections. Following with urology now.  Will get fasting 8am testosterone and LH with other annual pituitary labs           Other Visit Diagnoses       History of radiation therapy        Relevant Orders    ACTH     Cortisol, 8AM    Insulin-Like Growth Factor    Luteinizing Hormone    T4, Free    Prolactin    TESTOSTERONE PANEL            F/u 1yr with fasting 8am labs 2wks before the visit  Fasting 8am labs in next week (testotsterone panel, fT4, PRL, IGF1, LH, 8am cortisol, ACTH (labs ordered by [facility] on 5/9/2023)    Ashly Edmonds MD

## 2023-06-27 ENCOUNTER — OFFICE VISIT (OUTPATIENT)
Dept: ENDOCRINOLOGY | Facility: CLINIC | Age: 53
End: 2023-06-27
Payer: COMMERCIAL

## 2023-06-27 VITALS
HEIGHT: 73 IN | DIASTOLIC BLOOD PRESSURE: 76 MMHG | OXYGEN SATURATION: 98 % | BODY MASS INDEX: 33.42 KG/M2 | WEIGHT: 252.19 LBS | SYSTOLIC BLOOD PRESSURE: 111 MMHG | HEART RATE: 90 BPM

## 2023-06-27 DIAGNOSIS — N52.9 ERECTILE DYSFUNCTION, UNSPECIFIED ERECTILE DYSFUNCTION TYPE: ICD-10-CM

## 2023-06-27 DIAGNOSIS — D35.2 PITUITARY ADENOMA: Primary | ICD-10-CM

## 2023-06-27 DIAGNOSIS — E03.8 SECONDARY HYPOTHYROIDISM: ICD-10-CM

## 2023-06-27 DIAGNOSIS — Z92.3 HISTORY OF RADIATION THERAPY: ICD-10-CM

## 2023-06-27 DIAGNOSIS — R79.89 LOW TESTOSTERONE IN MALE: ICD-10-CM

## 2023-06-27 PROCEDURE — 1159F MED LIST DOCD IN RCRD: CPT | Mod: CPTII,S$GLB,, | Performed by: INTERNAL MEDICINE

## 2023-06-27 PROCEDURE — 99999 PR PBB SHADOW E&M-EST. PATIENT-LVL III: ICD-10-PCS | Mod: PBBFAC,,, | Performed by: INTERNAL MEDICINE

## 2023-06-27 PROCEDURE — 3008F BODY MASS INDEX DOCD: CPT | Mod: CPTII,S$GLB,, | Performed by: INTERNAL MEDICINE

## 2023-06-27 PROCEDURE — 3074F SYST BP LT 130 MM HG: CPT | Mod: CPTII,S$GLB,, | Performed by: INTERNAL MEDICINE

## 2023-06-27 PROCEDURE — 3078F DIAST BP <80 MM HG: CPT | Mod: CPTII,S$GLB,, | Performed by: INTERNAL MEDICINE

## 2023-06-27 PROCEDURE — 99999 PR PBB SHADOW E&M-EST. PATIENT-LVL III: CPT | Mod: PBBFAC,,, | Performed by: INTERNAL MEDICINE

## 2023-06-27 PROCEDURE — 1159F PR MEDICATION LIST DOCUMENTED IN MEDICAL RECORD: ICD-10-PCS | Mod: CPTII,S$GLB,, | Performed by: INTERNAL MEDICINE

## 2023-06-27 PROCEDURE — 99214 PR OFFICE/OUTPT VISIT, EST, LEVL IV, 30-39 MIN: ICD-10-PCS | Mod: S$GLB,,, | Performed by: INTERNAL MEDICINE

## 2023-06-27 PROCEDURE — 99214 OFFICE O/P EST MOD 30 MIN: CPT | Mod: S$GLB,,, | Performed by: INTERNAL MEDICINE

## 2023-06-27 PROCEDURE — 1160F RVW MEDS BY RX/DR IN RCRD: CPT | Mod: CPTII,S$GLB,, | Performed by: INTERNAL MEDICINE

## 2023-06-27 PROCEDURE — 3078F PR MOST RECENT DIASTOLIC BLOOD PRESSURE < 80 MM HG: ICD-10-PCS | Mod: CPTII,S$GLB,, | Performed by: INTERNAL MEDICINE

## 2023-06-27 PROCEDURE — 1160F PR REVIEW ALL MEDS BY PRESCRIBER/CLIN PHARMACIST DOCUMENTED: ICD-10-PCS | Mod: CPTII,S$GLB,, | Performed by: INTERNAL MEDICINE

## 2023-06-27 PROCEDURE — 3008F PR BODY MASS INDEX (BMI) DOCUMENTED: ICD-10-PCS | Mod: CPTII,S$GLB,, | Performed by: INTERNAL MEDICINE

## 2023-06-27 PROCEDURE — 3074F PR MOST RECENT SYSTOLIC BLOOD PRESSURE < 130 MM HG: ICD-10-PCS | Mod: CPTII,S$GLB,, | Performed by: INTERNAL MEDICINE

## 2023-06-27 NOTE — PATIENT INSTRUCTIONS
Follow up in 1 year with fasting 8am labs 2 weeks before the appointment    Fasting 8am labs in next week     Symptoms of low cortisol or adrenal insufficiency can be the following:    - unexplained weight loss  - low blood pressure  - lightheadedness  - nausea and/or vomiting  - abdominal pain  - new severe fatigue    If you start experiencing these symptoms please let me know so that I can order and 8 am fasting blood draw to look at your cortisol levels.

## 2023-06-27 NOTE — ASSESSMENT & PLAN NOTE
Imaging it stable. I have independently reviewed the pituitary MRI from 5/16/2023 and there has been no increase in size of lesion and no new lesions.   No HA or vision changes.  On levothyroxine for secondary hypothyroidism. No s/sx of AI. Has ED and follows with urology. Not on testosterone replacement.   Needs fasting 8am pituitary labs now: testotsterone panel, fT4, PRL, IGF1, LH, 8am cortisol, ACTH   Reviewed s/sx of AI and instructed pt to notify us right away if he develops sx

## 2023-06-27 NOTE — ASSESSMENT & PLAN NOTE
Hx of slightly low testosterone as well as normal testosterone levels with normal libido but still incomplete erections. Following with urology now.  Will get fasting 8am testosterone and LH with other annual pituitary labs

## 2023-06-27 NOTE — PROGRESS NOTES
I have reviewed and concur with Dr. Ashly De Santiago's history, physical, assessment, and plan.  I have personally interviewed and examined the patient.  See below addendum for my evaluation and additional findings.    Clinically doing well on on    Alice Verduzco MD

## 2023-06-27 NOTE — ASSESSMENT & PLAN NOTE
Clinically euthyroid.  Will check fT4 and adjust dose of levothyroxine (currently on 75 mcg daily) to keep fT4 in upper half of normal.   He needs to have labs done.

## 2023-06-29 NOTE — PROGRESS NOTES
I have reviewed and concur with Dr. Edmonds's history, physical, assessment, and plan.  I have personally interviewed and examined the patient.  See below addendum for my evaluation and additional findings.      Alice Verduzco MD

## 2023-07-03 ENCOUNTER — LAB VISIT (OUTPATIENT)
Dept: LAB | Facility: HOSPITAL | Age: 53
End: 2023-07-03
Attending: INTERNAL MEDICINE
Payer: COMMERCIAL

## 2023-07-03 DIAGNOSIS — Z92.3 HISTORY OF RADIATION THERAPY: ICD-10-CM

## 2023-07-03 DIAGNOSIS — D35.2 PITUITARY ADENOMA: ICD-10-CM

## 2023-07-03 LAB
CORTIS SERPL-MCNC: 13.7 UG/DL (ref 4.3–22.4)
LH SERPL-ACNC: 2.3 MIU/ML (ref 0.6–12.1)
PROLACTIN SERPL IA-MCNC: 11.4 NG/ML (ref 3.5–19.4)
T4 FREE SERPL-MCNC: 0.93 NG/DL (ref 0.71–1.51)

## 2023-07-03 PROCEDURE — 82024 ASSAY OF ACTH: CPT | Performed by: INTERNAL MEDICINE

## 2023-07-03 PROCEDURE — 84270 ASSAY OF SEX HORMONE GLOBUL: CPT | Performed by: INTERNAL MEDICINE

## 2023-07-03 PROCEDURE — 84305 ASSAY OF SOMATOMEDIN: CPT | Performed by: INTERNAL MEDICINE

## 2023-07-03 PROCEDURE — 83002 ASSAY OF GONADOTROPIN (LH): CPT | Performed by: INTERNAL MEDICINE

## 2023-07-03 PROCEDURE — 82533 TOTAL CORTISOL: CPT | Performed by: INTERNAL MEDICINE

## 2023-07-03 PROCEDURE — 84439 ASSAY OF FREE THYROXINE: CPT | Performed by: INTERNAL MEDICINE

## 2023-07-03 PROCEDURE — 84146 ASSAY OF PROLACTIN: CPT | Performed by: INTERNAL MEDICINE

## 2023-07-03 PROCEDURE — 36415 COLL VENOUS BLD VENIPUNCTURE: CPT | Performed by: INTERNAL MEDICINE

## 2023-07-03 PROCEDURE — 84403 ASSAY OF TOTAL TESTOSTERONE: CPT | Performed by: INTERNAL MEDICINE

## 2023-07-05 LAB — ACTH PLAS-MCNC: 33 PG/ML (ref 0–46)

## 2023-07-10 DIAGNOSIS — E03.8 SECONDARY HYPOTHYROIDISM: Primary | ICD-10-CM

## 2023-07-10 LAB
IGF-I SERPL-MCNC: 92 NG/ML (ref 37–245)
IGF-I Z-SCORE SERPL: -0.52 SD

## 2023-07-10 RX ORDER — LEVOTHYROXINE SODIUM 88 UG/1
88 TABLET ORAL
Qty: 30 TABLET | Refills: 11 | Status: SHIPPED | OUTPATIENT
Start: 2023-07-10 | End: 2023-08-09 | Stop reason: SDUPTHER

## 2023-07-11 LAB
ALBUMIN SERPL-MCNC: 4.1 G/DL (ref 3.6–5.1)
SHBG SERPL-SCNC: 29 NMOL/L (ref 10–50)
TESTOST FREE SERPL-MCNC: 35.8 PG/ML (ref 46–224)
TESTOST SERPL-MCNC: 252 NG/DL (ref 250–1100)
TESTOSTERONE.FREE+WB SERPL-MCNC: 67.4 NG/DL (ref 110–575)

## 2023-07-11 RX ORDER — ALLOPURINOL 300 MG/1
300 TABLET ORAL DAILY
Qty: 30 TABLET | Refills: 0 | Status: SHIPPED | OUTPATIENT
Start: 2023-07-11

## 2023-07-12 ENCOUNTER — PATIENT MESSAGE (OUTPATIENT)
Dept: ENDOCRINOLOGY | Facility: CLINIC | Age: 53
End: 2023-07-12
Payer: COMMERCIAL

## 2023-07-12 DIAGNOSIS — D49.7 PITUITARY TUMOR: ICD-10-CM

## 2023-07-12 DIAGNOSIS — R79.89 LOW TESTOSTERONE IN MALE: ICD-10-CM

## 2023-07-13 RX ORDER — TESTOSTERONE 20.25 MG/1.25G
40.5 GEL TOPICAL DAILY
Qty: 75 G | Refills: 5 | Status: CANCELLED | OUTPATIENT
Start: 2023-07-13

## 2023-07-17 DIAGNOSIS — D49.7 PITUITARY TUMOR: Primary | ICD-10-CM

## 2023-07-17 DIAGNOSIS — R79.89 LOW TESTOSTERONE IN MALE: ICD-10-CM

## 2023-07-17 RX ORDER — TESTOSTERONE 20.25 MG/1.25G
40.5 GEL TOPICAL DAILY
Qty: 75 G | Refills: 5 | Status: SHIPPED | OUTPATIENT
Start: 2023-07-17 | End: 2023-07-19 | Stop reason: SDUPTHER

## 2023-07-19 RX ORDER — TESTOSTERONE 20.25 MG/1.25G
40.5 GEL TOPICAL DAILY
Qty: 75 G | Refills: 5 | Status: SHIPPED | OUTPATIENT
Start: 2023-07-19 | End: 2023-11-20 | Stop reason: SDUPTHER

## 2023-08-09 DIAGNOSIS — E03.8 SECONDARY HYPOTHYROIDISM: ICD-10-CM

## 2023-08-09 RX ORDER — LEVOTHYROXINE SODIUM 88 UG/1
88 TABLET ORAL
Qty: 30 TABLET | Refills: 11 | Status: SHIPPED | OUTPATIENT
Start: 2023-08-09 | End: 2024-08-08

## 2023-08-30 ENCOUNTER — LAB VISIT (OUTPATIENT)
Dept: LAB | Facility: HOSPITAL | Age: 53
End: 2023-08-30
Attending: INTERNAL MEDICINE
Payer: COMMERCIAL

## 2023-08-30 ENCOUNTER — PATIENT MESSAGE (OUTPATIENT)
Dept: ENDOCRINOLOGY | Facility: CLINIC | Age: 53
End: 2023-08-30
Payer: COMMERCIAL

## 2023-08-30 DIAGNOSIS — H40.011 OPEN ANGLE WITH BORDERLINE FINDINGS AND LOW GLAUCOMA RISK IN RIGHT EYE: ICD-10-CM

## 2023-08-30 DIAGNOSIS — H40.022 OPEN ANGLE WITH BORDERLINE FINDINGS AND HIGH GLAUCOMA RISK IN LEFT EYE: ICD-10-CM

## 2023-08-30 DIAGNOSIS — R79.89 LOW TESTOSTERONE IN MALE: Primary | ICD-10-CM

## 2023-08-30 DIAGNOSIS — R79.89 LOW TESTOSTERONE IN MALE: ICD-10-CM

## 2023-08-30 LAB
ALBUMIN SERPL BCP-MCNC: 4.2 G/DL (ref 3.5–5.2)
ALP SERPL-CCNC: 49 U/L (ref 55–135)
ALT SERPL W/O P-5'-P-CCNC: 38 U/L (ref 10–44)
ANION GAP SERPL CALC-SCNC: 9 MMOL/L (ref 8–16)
AST SERPL-CCNC: 24 U/L (ref 10–40)
BASOPHILS # BLD AUTO: 0.02 K/UL (ref 0–0.2)
BASOPHILS NFR BLD: 0.6 % (ref 0–1.9)
BILIRUB SERPL-MCNC: 0.3 MG/DL (ref 0.1–1)
BUN SERPL-MCNC: 17 MG/DL (ref 6–20)
CALCIUM SERPL-MCNC: 9.1 MG/DL (ref 8.7–10.5)
CHLORIDE SERPL-SCNC: 107 MMOL/L (ref 95–110)
CO2 SERPL-SCNC: 24 MMOL/L (ref 23–29)
COMPLEXED PSA SERPL-MCNC: 1 NG/ML (ref 0–4)
CREAT SERPL-MCNC: 1.3 MG/DL (ref 0.5–1.4)
DIFFERENTIAL METHOD: ABNORMAL
EOSINOPHIL # BLD AUTO: 0.1 K/UL (ref 0–0.5)
EOSINOPHIL NFR BLD: 1.4 % (ref 0–8)
ERYTHROCYTE [DISTWIDTH] IN BLOOD BY AUTOMATED COUNT: 12.5 % (ref 11.5–14.5)
EST. GFR  (NO RACE VARIABLE): >60 ML/MIN/1.73 M^2
GLUCOSE SERPL-MCNC: 105 MG/DL (ref 70–110)
HCT VFR BLD AUTO: 46 % (ref 40–54)
HGB BLD-MCNC: 15.5 G/DL (ref 14–18)
IMM GRANULOCYTES # BLD AUTO: 0.01 K/UL (ref 0–0.04)
IMM GRANULOCYTES NFR BLD AUTO: 0.3 % (ref 0–0.5)
LYMPHOCYTES # BLD AUTO: 1.5 K/UL (ref 1–4.8)
LYMPHOCYTES NFR BLD: 41.4 % (ref 18–48)
MCH RBC QN AUTO: 29 PG (ref 27–31)
MCHC RBC AUTO-ENTMCNC: 33.7 G/DL (ref 32–36)
MCV RBC AUTO: 86 FL (ref 82–98)
MONOCYTES # BLD AUTO: 0.3 K/UL (ref 0.3–1)
MONOCYTES NFR BLD: 8 % (ref 4–15)
NEUTROPHILS # BLD AUTO: 1.7 K/UL (ref 1.8–7.7)
NEUTROPHILS NFR BLD: 48.3 % (ref 38–73)
NRBC BLD-RTO: 0 /100 WBC
PLATELET # BLD AUTO: 238 K/UL (ref 150–450)
PMV BLD AUTO: 10 FL (ref 9.2–12.9)
POTASSIUM SERPL-SCNC: 4.7 MMOL/L (ref 3.5–5.1)
PROT SERPL-MCNC: 7.1 G/DL (ref 6–8.4)
RBC # BLD AUTO: 5.35 M/UL (ref 4.6–6.2)
SODIUM SERPL-SCNC: 140 MMOL/L (ref 136–145)
TESTOST SERPL-MCNC: 200 NG/DL (ref 304–1227)
WBC # BLD AUTO: 3.5 K/UL (ref 3.9–12.7)

## 2023-08-30 PROCEDURE — 84403 ASSAY OF TOTAL TESTOSTERONE: CPT | Performed by: INTERNAL MEDICINE

## 2023-08-30 PROCEDURE — 80053 COMPREHEN METABOLIC PANEL: CPT | Performed by: INTERNAL MEDICINE

## 2023-08-30 PROCEDURE — 36415 COLL VENOUS BLD VENIPUNCTURE: CPT | Performed by: INTERNAL MEDICINE

## 2023-08-30 PROCEDURE — 85025 COMPLETE CBC W/AUTO DIFF WBC: CPT | Performed by: INTERNAL MEDICINE

## 2023-08-30 PROCEDURE — 84153 ASSAY OF PSA TOTAL: CPT | Performed by: INTERNAL MEDICINE

## 2023-08-30 RX ORDER — LATANOPROST 50 UG/ML
SOLUTION/ DROPS OPHTHALMIC
Qty: 7.5 ML | Refills: 3 | Status: SHIPPED | OUTPATIENT
Start: 2023-08-30 | End: 2023-08-30 | Stop reason: SDUPTHER

## 2023-08-30 RX ORDER — LATANOPROST 50 UG/ML
1 SOLUTION/ DROPS OPHTHALMIC NIGHTLY
Qty: 7.5 ML | Refills: 3 | Status: SHIPPED | OUTPATIENT
Start: 2023-08-30

## 2023-08-31 ENCOUNTER — PATIENT MESSAGE (OUTPATIENT)
Dept: UROLOGY | Facility: CLINIC | Age: 53
End: 2023-08-31
Payer: COMMERCIAL

## 2023-08-31 DIAGNOSIS — N52.01 ERECTILE DYSFUNCTION DUE TO ARTERIAL INSUFFICIENCY: ICD-10-CM

## 2023-08-31 RX ORDER — TADALAFIL 20 MG/1
20 TABLET ORAL DAILY
Qty: 20 TABLET | Refills: 11 | Status: CANCELLED | OUTPATIENT
Start: 2023-08-31 | End: 2024-08-30

## 2023-08-31 NOTE — TELEPHONE ENCOUNTER
Started AndroGel 07/20/2023.  Please schedule testosterone (any time of day) and CBC in 3 months followed by Friday pituitary Clinic virtual visit with me 2 weeks later

## 2023-09-01 DIAGNOSIS — N52.01 ERECTILE DYSFUNCTION DUE TO ARTERIAL INSUFFICIENCY: ICD-10-CM

## 2023-09-01 RX ORDER — TADALAFIL 20 MG/1
20 TABLET ORAL DAILY
Qty: 20 TABLET | Refills: 0 | Status: SHIPPED | OUTPATIENT
Start: 2023-09-01 | End: 2023-10-17 | Stop reason: SDUPTHER

## 2023-09-19 ENCOUNTER — TELEPHONE (OUTPATIENT)
Dept: UROLOGY | Facility: CLINIC | Age: 53
End: 2023-09-19
Payer: COMMERCIAL

## 2023-09-19 ENCOUNTER — LAB VISIT (OUTPATIENT)
Dept: LAB | Facility: HOSPITAL | Age: 53
End: 2023-09-19
Attending: INTERNAL MEDICINE
Payer: COMMERCIAL

## 2023-09-19 DIAGNOSIS — E03.8 SECONDARY HYPOTHYROIDISM: ICD-10-CM

## 2023-09-19 LAB — T4 FREE SERPL-MCNC: 1.14 NG/DL (ref 0.71–1.51)

## 2023-09-19 PROCEDURE — 84439 ASSAY OF FREE THYROXINE: CPT | Performed by: INTERNAL MEDICINE

## 2023-09-19 PROCEDURE — 36415 COLL VENOUS BLD VENIPUNCTURE: CPT | Performed by: INTERNAL MEDICINE

## 2023-10-17 ENCOUNTER — OFFICE VISIT (OUTPATIENT)
Dept: UROLOGY | Facility: CLINIC | Age: 53
End: 2023-10-17
Payer: COMMERCIAL

## 2023-10-17 VITALS
BODY MASS INDEX: 33.32 KG/M2 | HEART RATE: 79 BPM | SYSTOLIC BLOOD PRESSURE: 121 MMHG | WEIGHT: 246 LBS | DIASTOLIC BLOOD PRESSURE: 78 MMHG | HEIGHT: 72 IN

## 2023-10-17 DIAGNOSIS — N13.8 BPH WITH URINARY OBSTRUCTION: Primary | ICD-10-CM

## 2023-10-17 DIAGNOSIS — N52.01 ERECTILE DYSFUNCTION DUE TO ARTERIAL INSUFFICIENCY: ICD-10-CM

## 2023-10-17 DIAGNOSIS — N40.1 BPH WITH URINARY OBSTRUCTION: Primary | ICD-10-CM

## 2023-10-17 PROCEDURE — 3078F PR MOST RECENT DIASTOLIC BLOOD PRESSURE < 80 MM HG: ICD-10-PCS | Mod: CPTII,S$GLB,, | Performed by: NURSE PRACTITIONER

## 2023-10-17 PROCEDURE — 1160F RVW MEDS BY RX/DR IN RCRD: CPT | Mod: CPTII,S$GLB,, | Performed by: NURSE PRACTITIONER

## 2023-10-17 PROCEDURE — 3074F SYST BP LT 130 MM HG: CPT | Mod: CPTII,S$GLB,, | Performed by: NURSE PRACTITIONER

## 2023-10-17 PROCEDURE — 1160F PR REVIEW ALL MEDS BY PRESCRIBER/CLIN PHARMACIST DOCUMENTED: ICD-10-PCS | Mod: CPTII,S$GLB,, | Performed by: NURSE PRACTITIONER

## 2023-10-17 PROCEDURE — 99214 PR OFFICE/OUTPT VISIT, EST, LEVL IV, 30-39 MIN: ICD-10-PCS | Mod: S$GLB,,, | Performed by: NURSE PRACTITIONER

## 2023-10-17 PROCEDURE — 99214 OFFICE O/P EST MOD 30 MIN: CPT | Mod: S$GLB,,, | Performed by: NURSE PRACTITIONER

## 2023-10-17 PROCEDURE — 3074F PR MOST RECENT SYSTOLIC BLOOD PRESSURE < 130 MM HG: ICD-10-PCS | Mod: CPTII,S$GLB,, | Performed by: NURSE PRACTITIONER

## 2023-10-17 PROCEDURE — 1159F MED LIST DOCD IN RCRD: CPT | Mod: CPTII,S$GLB,, | Performed by: NURSE PRACTITIONER

## 2023-10-17 PROCEDURE — 3008F PR BODY MASS INDEX (BMI) DOCUMENTED: ICD-10-PCS | Mod: CPTII,S$GLB,, | Performed by: NURSE PRACTITIONER

## 2023-10-17 PROCEDURE — 99999 PR PBB SHADOW E&M-EST. PATIENT-LVL III: ICD-10-PCS | Mod: PBBFAC,,, | Performed by: NURSE PRACTITIONER

## 2023-10-17 PROCEDURE — 99999 PR PBB SHADOW E&M-EST. PATIENT-LVL III: CPT | Mod: PBBFAC,,, | Performed by: NURSE PRACTITIONER

## 2023-10-17 PROCEDURE — 3078F DIAST BP <80 MM HG: CPT | Mod: CPTII,S$GLB,, | Performed by: NURSE PRACTITIONER

## 2023-10-17 PROCEDURE — 1159F PR MEDICATION LIST DOCUMENTED IN MEDICAL RECORD: ICD-10-PCS | Mod: CPTII,S$GLB,, | Performed by: NURSE PRACTITIONER

## 2023-10-17 PROCEDURE — 3008F BODY MASS INDEX DOCD: CPT | Mod: CPTII,S$GLB,, | Performed by: NURSE PRACTITIONER

## 2023-10-17 RX ORDER — TADALAFIL 20 MG/1
20 TABLET ORAL DAILY
Qty: 20 TABLET | Refills: 10 | Status: SHIPPED | OUTPATIENT
Start: 2023-10-17 | End: 2024-10-16

## 2023-10-17 RX ORDER — TADALAFIL 20 MG/1
20 TABLET ORAL DAILY
Qty: 20 TABLET | Refills: 10 | Status: CANCELLED | OUTPATIENT
Start: 2023-10-17 | End: 2024-10-16

## 2023-10-17 RX ORDER — TADALAFIL 20 MG/1
20 TABLET ORAL DAILY
Qty: 10 TABLET | Refills: 0 | Status: SHIPPED | OUTPATIENT
Start: 2023-10-17 | End: 2023-10-17 | Stop reason: SDUPTHER

## 2023-10-17 NOTE — PROGRESS NOTES
CHIEF COMPLAINT:    Mr. Santiago is a 53 y.o. male presenting for annual visit.      PRESENTING ILLNESS:    Michael Santiago is a 53 y.o. male with a PMH of pituitary tumor, low testosterone, nocturia who presents for annual visit.    Last seen in urology department 7/12/22 for erectile dysfunction follow up.    Presents today for annual visit.  Initially prescribe viagra for ED which was not working.  Cialis then prescribed with some benefit.  Of note recently started on testosterone replacement.  Again discussed factors that affect erectile dysfunction including low testosterone, obesity, HLD, HTN, DM and smoking.  Recommend smoking cessation, weight loss, and increased exercise.    He reports a good urinary stream and complete bladder emptying.  No urinary complaints today.    No family history of prostate cancer history. Last PSA 1.0 8/30/23.    REVIEW OF SYSTEMS:    Review of Systems   Constitutional:  Negative for chills and fever.   Respiratory:  Negative for shortness of breath.    Cardiovascular:  Negative for chest pain.   Gastrointestinal:  Negative for constipation and diarrhea.   Genitourinary:  Negative for dysuria, flank pain, frequency, hematuria and urgency.   Neurological:  Negative for dizziness and weakness.       PATIENT HISTORY:    Past Medical History:   Diagnosis Date    Cataract     Gout     Headache(784.0)     History of radiation therapy 4/5/2022    Hypothyroidism     Normal tension glaucoma of both eyes 11/18/2014    Obesity 7/29/2014    Pituitary adenoma        Family History   Problem Relation Age of Onset    Stroke Father     Amblyopia Neg Hx     Blindness Neg Hx     Glaucoma Neg Hx     Macular degeneration Neg Hx     Retinal detachment Neg Hx     Strabismus Neg Hx     Cataracts Neg Hx        Allergies:  Patient has no known allergies.    Medications:    Current Outpatient Medications:     allopurinoL (ZYLOPRIM) 300 MG tablet, Take 1 tablet (300 mg total) by mouth once daily., Disp: 30  tablet, Rfl: 0    fenofibrate (TRICOR) 145 MG tablet, Take 145 mg by mouth once daily., Disp: , Rfl:     latanoprost 0.005 % ophthalmic solution, Place 1 drop into both eyes every evening., Disp: 7.5 mL, Rfl: 3    levothyroxine (SYNTHROID) 88 MCG tablet, Take 1 tablet (88 mcg total) by mouth before breakfast., Disp: 30 tablet, Rfl: 11    testosterone (ANDROGEL) 20.25 mg/1.25 gram (1.62 %) GlPm, Place 40.5 mg onto the skin once daily. (Two pumps), Disp: 75 g, Rfl: 5    sildenafiL (VIAGRA) 100 MG tablet, Take 1 tablet (100 mg total) by mouth daily as needed for Erectile Dysfunction. Dispense generic, Disp: 10 tablet, Rfl: 11    tadalafiL (CIALIS) 20 MG Tab, Take 1 tablet (20 mg total) by mouth once daily., Disp: 20 tablet, Rfl: 10    PHYSICAL EXAMINATION:      Physical Exam  Vitals and nursing note reviewed.   Constitutional:       Appearance: Normal appearance. He is well-developed.   HENT:      Head: Normocephalic and atraumatic.   Eyes:      Pupils: Pupils are equal, round, and reactive to light.   Pulmonary:      Effort: Pulmonary effort is normal.   Genitourinary:     Penis: Normal.       Testes: Normal.      Prostate: Enlarged. Not tender.      Rectum: Normal.      Comments: Prostate smooth, no nodules felt  Musculoskeletal:         General: Normal range of motion.      Cervical back: Normal range of motion.   Skin:     General: Skin is warm and dry.   Neurological:      Mental Status: He is alert and oriented to person, place, and time.   Psychiatric:         Behavior: Behavior normal.       LABS:    Lab Results   Component Value Date    PSA 1.0 08/30/2023    PSA 1.1 02/22/2021    PSADIAG 0.80 04/28/2022       IMPRESSION:  Encounter Diagnoses   Name Primary?    BPH with urinary obstruction Yes    Erectile dysfunction due to arterial insufficiency          PLAN:  Problem List Items Addressed This Visit       Erectile dysfunction    Relevant Medications    tadalafiL (CIALIS) 20 MG Tab    BPH with urinary  obstruction - Primary         1. Erectile dysfunction   Continue Cialis. [facility] [place] then [facility] [place] [facility]    2. bph without obstruction   Reviewed PSA, wnl   PILAR completed    3. RTC in 1 yr or sooner prn    I spent 30 minutes with the patient. Over 50% of the visit was spent in counseling.      Alesia Loredo NP

## 2023-11-10 ENCOUNTER — OFFICE VISIT (OUTPATIENT)
Dept: ENDOCRINOLOGY | Facility: CLINIC | Age: 53
End: 2023-11-10
Payer: COMMERCIAL

## 2023-11-10 DIAGNOSIS — E03.8 SECONDARY HYPOTHYROIDISM: ICD-10-CM

## 2023-11-10 DIAGNOSIS — R79.89 LOW TESTOSTERONE IN MALE: Primary | ICD-10-CM

## 2023-11-10 DIAGNOSIS — Z92.3 HISTORY OF RADIATION THERAPY: ICD-10-CM

## 2023-11-10 DIAGNOSIS — D35.2 PITUITARY ADENOMA: ICD-10-CM

## 2023-11-10 DIAGNOSIS — Z92.3 HISTORY OF THERAPEUTIC RADIATION: ICD-10-CM

## 2023-11-10 PROCEDURE — 99214 PR OFFICE/OUTPT VISIT, EST, LEVL IV, 30-39 MIN: ICD-10-PCS | Mod: 95,,, | Performed by: INTERNAL MEDICINE

## 2023-11-10 PROCEDURE — 99214 OFFICE O/P EST MOD 30 MIN: CPT | Mod: 95,,, | Performed by: INTERNAL MEDICINE

## 2023-11-10 NOTE — PROGRESS NOTES
Pituitary Clinic Follow-Up  11/10/2023    The patient's last visit with Dr Verduzco was on 4/5/2022.     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    History of Present Illness  Michael Santiago  Is a 53 y.o. man who is s/p TSR 6/30/14 with Dr. 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  preop eval indicated non-functional adenoma, path report w/o staining reports only adenoma.  He  Does have some residual tumor in the right side of the sella, being followed w/ MRI, found to have growth in 8/2020 so he was treated w/ GKRS in 10/2020.     Initial presentation: to ent dizziness ha and neck pain   Found to have a macroadenoma - nonfunctional    Interval hx:  Since last visit started  AndroGel 07/20/2023.    Levothyroxine increased    He denies symptoms of adrenal insufficiency (lightheadedness, N/V/abd pain, hypotension).   No unexplained wt loss     No HA or vision change (follows with eye doctor for glaucoma every 6 month(s)    Has seen urology for ED -- tried viagra, did not work. Now on Cialis, also not really working well -- does not take it often. Reports normal libido but incomplete erections.  No improvement since starting androgel     Imaging:  MRI Pituitary 5/16/2023  There is continued heterogeneous T2 hyperintensity and hypoenhancement within the right adenohypophysis this measures approximately 1.0 cm TV and and 0.8 cm craniocaudal previously measuring 1.2 x 0.8 cm.  No evidence for new lesion or increased size lesion.  No mass effect on the suprasellar neurovascular structures.    Pituitary labs:   Latest Reference Range & Units 07/03/23 07:40 08/30/23 08:12 09/19/23 12:07   Cortisol, 8 AM 4.30 - 22.40 ug/dL 13.70     ACTH 0 - 46 pg/mL 33     Somatomedin (IGF-I) 37 - 245 ng/mL 92     Free T4 0.71 - 1.51 ng/dL 0.93  1.14   LH 0.6 - 12.1 mIU/mL 2.3     Prolactin 3.5 - 19.4 ng/mL 11.4     Sex  Hormone Binding Globulin 10 - 50 nmol/L 29     Testosterone Total 250 - 1100 ng/dL 252     Testosterone, Bioavailable 110.0 - 575.0 ng/dL 67.4 (L)     Testosterone, Free 46.0 - 224.0 pg/mL 35.8 (L)     Testosterone, Total 304 - 1227 ng/dL  200 (L)        Regarding secondary Hypothyroidism:  On 88 mcg of levothyroxine daily (increased from 75 mcg daily 7/3/23)    Takes first thing in the morning 30 minutes prior to meal    Lab Results   Component Value Date    TSH 0.824 2018    FREET4 1.14 2023        PSA, Screen (ng/mL)   Date Value   2023 1.0     Imagin2023:  Sella: Pituitary gland is stable in height.  There is continued heterogeneous T2 hyperintensity and hypoenhancement within the right adenohypophysis this measures approximately 1.0 cm TV and and 0.8 cm craniocaudal previously measuring 1.2 x 0.8 cm.  No evidence for new lesion or increased size lesion.  No mass effect on the suprasellar neurovascular structures..    MR sella: Continued though slightly reduced sized heterogeneous hypoenhancing lesion in the right aspect of the sella suggestive for evolving pituitary adenoma.  No evidence for increased size lesion or mass effect on the suprasellar neurovascular structures.  Clinical correlation and continued follow-up advised.    2022:   Sella: The known hypoenhancing focus within the right aspect of the adenohypophysis is slightly smaller than on the study from 2021.  It measures 8 mm craniocaudal by 12 mm transverse compared to prior 11 mm craniocaudal by 12 mm transverse.  There is diminished suprasellar extension with persistent deviation of the infundibulum toward the left.    2020  Postsurgical changes from prior pituitary macroadenoma resection.  Hypoenhancing mass in the right side of the pituitary gland which measures approximately 1.6 x 0.9 x 1.3 cm, increased from 1.1 x 0.8 x 1.0 cm (2018) and 0.9 x 0.8 x 0.5 cm (2016).  Persistent mild leftward  infundibular displacement.  No significant mass effect on the optic chiasm or adjacent internal carotid artery.     8/28/18  MRI - hypoenhancing mass in right side of the pituitary gland measuring 11 x 8 by 10 mm.  This has increased in size slightly by around 2 mm compared with the prior study 08/02/2016.  There is minimal displacement of the optic chiasm insertion to the left.      Mri 10/31/14  1. History of prior pituitary neoplasm resection with some heterogeneous enhancement to the right of midline as described above which could represent residual pituitary neoplasm versus postsurgical change. Clinical correlation recommended. Follow can   be obtained to document stability.      Current Outpatient Medications:     allopurinoL (ZYLOPRIM) 300 MG tablet, Take 1 tablet (300 mg total) by mouth once daily., Disp: 30 tablet, Rfl: 0    fenofibrate (TRICOR) 145 MG tablet, Take 145 mg by mouth once daily., Disp: , Rfl:     latanoprost 0.005 % ophthalmic solution, Place 1 drop into both eyes every evening., Disp: 7.5 mL, Rfl: 3    levothyroxine (SYNTHROID) 88 MCG tablet, Take 1 tablet (88 mcg total) by mouth before breakfast., Disp: 30 tablet, Rfl: 11    sildenafiL (VIAGRA) 100 MG tablet, Take 1 tablet (100 mg total) by mouth daily as needed for Erectile Dysfunction. Dispense generic, Disp: 10 tablet, Rfl: 11    tadalafiL (CIALIS) 20 MG Tab, Take 1 tablet (20 mg total) by mouth once daily., Disp: 20 tablet, Rfl: 10    testosterone (ANDROGEL) 20.25 mg/1.25 gram (1.62 %) GlPm, Place 40.5 mg onto the skin once daily. (Two pumps), Disp: 75 g, Rfl: 5   ROS: see hpi    Objective:   There is no height or weight on file to calculate BMI.  There were no vitals taken for this visit.  Constitutional:          Pleasant,  in no acute distress.   HENT:   Eyes:                          No scleral icterus.   Respiratory:               Effort normal   Neurological:             normal speech  Psych:                        Normal mood and  affect.        Pituitary MRI 5/16/2023:      Pituitary MRI 4/4/22      Assessment and Plan       Problem List Items Addressed This Visit          1 - High    Pituitary adenoma     No new symptoms, rtc with me and Dr. Villela in 6 mo            2     Secondary hypothyroidism     fT4 at goal, continue levothyroxine 88 mcg daily or 75 mcg 8 tab/wk         Relevant Orders    T4, Free       3     History of therapeutic radiation     Currently patient has no symptoms of adrenal insufficiency however we reviewed that given history of gamma knife radiosurgery to the sella, the patient is at risk of developing new pituitary hormone deficiencies at any time.  They will let me know if they have symptoms of adrenal insufficiency.  Will plan to screen with yearly labs.              4     Low testosterone in male - Primary     On androgel 2 pumps/day since 7/2023, labs 1 month(s) after starting with persistently low testosterone.  Will recheck now and if still low increase androgel to 3 pumps/day and monitor cbc         Relevant Orders    Testosterone    CBC Auto Differential    PSA, Screening     Other Visit Diagnoses       History of radiation therapy        Relevant Orders    ACTH    Cortisol, 8AM    Insulin-Like Growth Factor    Prolactin            Has recall for 6 month(s) follow up, will need fasting 8am labs 2 weeks prior to visit (cbc, testosterone , fT4, PRL, IGF1, 8am cortisol, ACTH, psa)    Needs labs [facility] [place] 11/16/23 9 am (cbc and testosterone only)      Alice Verduzco MD

## 2023-11-10 NOTE — ASSESSMENT & PLAN NOTE
Currently patient has no symptoms of adrenal insufficiency however we reviewed that given history of gamma knife radiosurgery to the sella, the patient is at risk of developing new pituitary hormone deficiencies at any time.  They will let me know if they have symptoms of adrenal insufficiency.  Will plan to screen with yearly labs.

## 2023-11-10 NOTE — ASSESSMENT & PLAN NOTE
On androgel 2 pumps/day since 7/2023, labs 1 month(s) after starting with persistently low testosterone.  Will recheck now and if still low increase androgel to 3 pumps/day and monitor cbc

## 2023-11-10 NOTE — PATIENT INSTRUCTIONS
Symptoms of low cortisol or adrenal insufficiency can be the following:    - unexplained weight loss  - low blood pressure  - lightheadedness  - nausea and/or vomiting  - abdominal pain  - new severe fatigue    If you start experiencing these symptoms please let me know so that I can order and 8 am fasting blood draw to look at your cortisol levels.        THYROID:  Continue levothyroxine 88 mcg daily (7 tablets over the span of the week)or you can use up the 75 mcg tablets by taking a total of 8 tablets over the span of the week by taking an extra tablet on Sundays.       As a reminder, it is typically best to take thyroid hormone first thing in the morning on an empty stomach by itself and then wait at least 30 mintues to eat or drink anything besides water to make sure your body absorbs the full dose of medication.  If you take any calcium, Tums, iron, or multivitamins then please separate them by at 4 hours from your thyroid medication as these supplements can interfere with the thyroid hormone getting absorbed in your gut and being used by your body.  It can be helpful to use a separate pill box for just your thyroid medication because unlike other medications, if you miss a dose of thyroid medication and see that it is still left in your pill box, you can always take it later in the week.  What is important is that on a weekly basis you are taking the same number of thyroid pills.  This cannot be done with most other medications.

## 2023-11-16 ENCOUNTER — LAB VISIT (OUTPATIENT)
Dept: LAB | Facility: HOSPITAL | Age: 53
End: 2023-11-16
Attending: INTERNAL MEDICINE
Payer: COMMERCIAL

## 2023-11-16 DIAGNOSIS — R79.89 LOW TESTOSTERONE IN MALE: ICD-10-CM

## 2023-11-16 LAB
BASOPHILS # BLD AUTO: 0.02 K/UL (ref 0–0.2)
BASOPHILS NFR BLD: 0.5 % (ref 0–1.9)
DIFFERENTIAL METHOD: ABNORMAL
EOSINOPHIL # BLD AUTO: 0.1 K/UL (ref 0–0.5)
EOSINOPHIL NFR BLD: 3.3 % (ref 0–8)
ERYTHROCYTE [DISTWIDTH] IN BLOOD BY AUTOMATED COUNT: 12.6 % (ref 11.5–14.5)
HCT VFR BLD AUTO: 49.2 % (ref 40–54)
HGB BLD-MCNC: 16.3 G/DL (ref 14–18)
IMM GRANULOCYTES # BLD AUTO: 0.01 K/UL (ref 0–0.04)
IMM GRANULOCYTES NFR BLD AUTO: 0.3 % (ref 0–0.5)
LYMPHOCYTES # BLD AUTO: 2 K/UL (ref 1–4.8)
LYMPHOCYTES NFR BLD: 49 % (ref 18–48)
MCH RBC QN AUTO: 28.8 PG (ref 27–31)
MCHC RBC AUTO-ENTMCNC: 33.1 G/DL (ref 32–36)
MCV RBC AUTO: 87 FL (ref 82–98)
MONOCYTES # BLD AUTO: 0.4 K/UL (ref 0.3–1)
MONOCYTES NFR BLD: 9.5 % (ref 4–15)
NEUTROPHILS # BLD AUTO: 1.5 K/UL (ref 1.8–7.7)
NEUTROPHILS NFR BLD: 37.4 % (ref 38–73)
NRBC BLD-RTO: 0 /100 WBC
PLATELET # BLD AUTO: 175 K/UL (ref 150–450)
PMV BLD AUTO: 10.3 FL (ref 9.2–12.9)
RBC # BLD AUTO: 5.66 M/UL (ref 4.6–6.2)
TESTOST SERPL-MCNC: 222 NG/DL (ref 304–1227)
WBC # BLD AUTO: 3.98 K/UL (ref 3.9–12.7)

## 2023-11-16 PROCEDURE — 85025 COMPLETE CBC W/AUTO DIFF WBC: CPT | Performed by: INTERNAL MEDICINE

## 2023-11-16 PROCEDURE — 36415 COLL VENOUS BLD VENIPUNCTURE: CPT | Performed by: INTERNAL MEDICINE

## 2023-11-16 PROCEDURE — 84403 ASSAY OF TOTAL TESTOSTERONE: CPT | Performed by: INTERNAL MEDICINE

## 2023-11-20 ENCOUNTER — PATIENT MESSAGE (OUTPATIENT)
Dept: ENDOCRINOLOGY | Facility: CLINIC | Age: 53
End: 2023-11-20
Payer: COMMERCIAL

## 2023-11-20 DIAGNOSIS — D49.7 PITUITARY TUMOR: ICD-10-CM

## 2023-11-20 DIAGNOSIS — R79.89 LOW TESTOSTERONE IN MALE: ICD-10-CM

## 2023-11-20 RX ORDER — TESTOSTERONE 20.25 MG/1.25G
3 GEL TOPICAL DAILY
Qty: 5 EACH | Refills: 1 | Status: SHIPPED | OUTPATIENT
Start: 2023-11-20 | End: 2023-11-29 | Stop reason: SDUPTHER

## 2023-11-20 NOTE — PROGRESS NOTES
Testosterone low on AndroGel 2 pumps per day.  Will increase to 3 pumps per day.  Please schedule total testosterone and CBC in 3 months

## 2023-11-29 ENCOUNTER — PATIENT MESSAGE (OUTPATIENT)
Dept: ENDOCRINOLOGY | Facility: CLINIC | Age: 53
End: 2023-11-29
Payer: COMMERCIAL

## 2023-11-29 DIAGNOSIS — D49.7 PITUITARY TUMOR: ICD-10-CM

## 2023-11-29 DIAGNOSIS — R79.89 LOW TESTOSTERONE IN MALE: ICD-10-CM

## 2023-11-29 RX ORDER — TESTOSTERONE 20.25 MG/1.25G
3 GEL TOPICAL DAILY
Qty: 5 EACH | Refills: 1 | Status: SHIPPED | OUTPATIENT
Start: 2023-11-29

## 2023-12-01 DIAGNOSIS — R79.89 LOW TESTOSTERONE IN MALE: ICD-10-CM

## 2023-12-01 DIAGNOSIS — D49.7 PITUITARY TUMOR: ICD-10-CM

## 2023-12-04 RX ORDER — TESTOSTERONE 20.25 MG/1.25G
3 GEL TOPICAL DAILY
Qty: 5 EACH | Refills: 1 | OUTPATIENT
Start: 2023-12-04

## 2024-02-21 ENCOUNTER — LAB VISIT (OUTPATIENT)
Dept: LAB | Facility: HOSPITAL | Age: 54
End: 2024-02-21
Attending: INTERNAL MEDICINE
Payer: COMMERCIAL

## 2024-02-21 DIAGNOSIS — R79.89 LOW TESTOSTERONE IN MALE: ICD-10-CM

## 2024-02-21 LAB
BASOPHILS # BLD AUTO: 0.01 K/UL (ref 0–0.2)
BASOPHILS NFR BLD: 0.3 % (ref 0–1.9)
DIFFERENTIAL METHOD BLD: ABNORMAL
EOSINOPHIL # BLD AUTO: 0.1 K/UL (ref 0–0.5)
EOSINOPHIL NFR BLD: 2.3 % (ref 0–8)
ERYTHROCYTE [DISTWIDTH] IN BLOOD BY AUTOMATED COUNT: 12.7 % (ref 11.5–14.5)
HCT VFR BLD AUTO: 47.4 % (ref 40–54)
HGB BLD-MCNC: 16.1 G/DL (ref 14–18)
IMM GRANULOCYTES # BLD AUTO: 0.01 K/UL (ref 0–0.04)
IMM GRANULOCYTES NFR BLD AUTO: 0.3 % (ref 0–0.5)
LYMPHOCYTES # BLD AUTO: 1.8 K/UL (ref 1–4.8)
LYMPHOCYTES NFR BLD: 45.5 % (ref 18–48)
MCH RBC QN AUTO: 28.7 PG (ref 27–31)
MCHC RBC AUTO-ENTMCNC: 34 G/DL (ref 32–36)
MCV RBC AUTO: 85 FL (ref 82–98)
MONOCYTES # BLD AUTO: 0.4 K/UL (ref 0.3–1)
MONOCYTES NFR BLD: 9.8 % (ref 4–15)
NEUTROPHILS # BLD AUTO: 1.7 K/UL (ref 1.8–7.7)
NEUTROPHILS NFR BLD: 41.8 % (ref 38–73)
NRBC BLD-RTO: 0 /100 WBC
PLATELET # BLD AUTO: 202 K/UL (ref 150–450)
PMV BLD AUTO: 9.9 FL (ref 9.2–12.9)
RBC # BLD AUTO: 5.61 M/UL (ref 4.6–6.2)
TESTOST SERPL-MCNC: 615 NG/DL (ref 304–1227)
WBC # BLD AUTO: 4 K/UL (ref 3.9–12.7)

## 2024-02-21 PROCEDURE — 36415 COLL VENOUS BLD VENIPUNCTURE: CPT | Performed by: INTERNAL MEDICINE

## 2024-02-21 PROCEDURE — 85025 COMPLETE CBC W/AUTO DIFF WBC: CPT | Performed by: INTERNAL MEDICINE

## 2024-02-21 PROCEDURE — 84403 ASSAY OF TOTAL TESTOSTERONE: CPT | Performed by: INTERNAL MEDICINE

## 2024-05-28 DIAGNOSIS — D49.7 PITUITARY TUMOR: ICD-10-CM

## 2024-05-28 DIAGNOSIS — R79.89 LOW TESTOSTERONE IN MALE: ICD-10-CM

## 2024-05-29 RX ORDER — TESTOSTERONE 20.25 MG/1.25G
3 GEL TOPICAL DAILY
Qty: 5 EACH | Refills: 1 | Status: SHIPPED | OUTPATIENT
Start: 2024-05-29

## 2024-07-15 DIAGNOSIS — E03.8 SECONDARY HYPOTHYROIDISM: ICD-10-CM

## 2024-07-15 RX ORDER — LEVOTHYROXINE SODIUM 88 UG/1
88 TABLET ORAL
Qty: 30 TABLET | Refills: 3 | Status: SHIPPED | OUTPATIENT
Start: 2024-07-15

## 2024-07-22 ENCOUNTER — PATIENT MESSAGE (OUTPATIENT)
Dept: UROLOGY | Facility: CLINIC | Age: 54
End: 2024-07-22
Payer: COMMERCIAL

## 2024-07-22 DIAGNOSIS — R79.89 LOW TESTOSTERONE IN MALE: ICD-10-CM

## 2024-07-22 DIAGNOSIS — D49.7 PITUITARY TUMOR: ICD-10-CM

## 2024-07-22 RX ORDER — TESTOSTERONE 20.25 MG/1.25G
3 GEL TOPICAL DAILY
Qty: 375 G | Refills: 1 | Status: SHIPPED | OUTPATIENT
Start: 2024-07-22

## 2024-07-24 ENCOUNTER — PATIENT MESSAGE (OUTPATIENT)
Dept: UROLOGY | Facility: CLINIC | Age: 54
End: 2024-07-24
Payer: COMMERCIAL

## 2024-09-20 ENCOUNTER — PATIENT MESSAGE (OUTPATIENT)
Dept: OPHTHALMOLOGY | Facility: CLINIC | Age: 54
End: 2024-09-20
Payer: COMMERCIAL

## 2024-09-20 DIAGNOSIS — H40.011 OPEN ANGLE WITH BORDERLINE FINDINGS AND LOW GLAUCOMA RISK IN RIGHT EYE: ICD-10-CM

## 2024-09-20 DIAGNOSIS — H40.022 OPEN ANGLE WITH BORDERLINE FINDINGS AND HIGH GLAUCOMA RISK IN LEFT EYE: ICD-10-CM

## 2024-09-20 RX ORDER — LATANOPROST 50 UG/ML
1 SOLUTION/ DROPS OPHTHALMIC NIGHTLY
Qty: 7.5 ML | Refills: 3 | Status: SHIPPED | OUTPATIENT
Start: 2024-09-20

## 2024-12-13 ENCOUNTER — PATIENT MESSAGE (OUTPATIENT)
Dept: NEUROSURGERY | Facility: CLINIC | Age: 54
End: 2024-12-13
Payer: COMMERCIAL

## 2024-12-16 ENCOUNTER — PATIENT MESSAGE (OUTPATIENT)
Dept: NEUROSURGERY | Facility: CLINIC | Age: 54
End: 2024-12-16
Payer: COMMERCIAL

## 2024-12-17 DIAGNOSIS — E03.8 SECONDARY HYPOTHYROIDISM: ICD-10-CM

## 2024-12-17 RX ORDER — LEVOTHYROXINE SODIUM 88 UG/1
88 TABLET ORAL
Qty: 30 TABLET | Refills: 3 | Status: CANCELLED | OUTPATIENT
Start: 2024-12-17

## 2024-12-18 DIAGNOSIS — E03.8 SECONDARY HYPOTHYROIDISM: Primary | ICD-10-CM

## 2024-12-18 RX ORDER — LEVOTHYROXINE SODIUM 88 UG/1
88 TABLET ORAL
Qty: 30 TABLET | Refills: 3 | Status: SHIPPED | OUTPATIENT
Start: 2024-12-18

## 2024-12-23 ENCOUNTER — PATIENT MESSAGE (OUTPATIENT)
Dept: ENDOCRINOLOGY | Facility: CLINIC | Age: 54
End: 2024-12-23
Payer: COMMERCIAL

## 2024-12-30 ENCOUNTER — LAB VISIT (OUTPATIENT)
Dept: LAB | Facility: HOSPITAL | Age: 54
End: 2024-12-30
Attending: STUDENT IN AN ORGANIZED HEALTH CARE EDUCATION/TRAINING PROGRAM
Payer: COMMERCIAL

## 2024-12-30 DIAGNOSIS — E03.8 SECONDARY HYPOTHYROIDISM: ICD-10-CM

## 2024-12-30 LAB — T4 FREE SERPL-MCNC: 0.99 NG/DL (ref 0.71–1.51)

## 2024-12-30 PROCEDURE — 84439 ASSAY OF FREE THYROXINE: CPT | Performed by: STUDENT IN AN ORGANIZED HEALTH CARE EDUCATION/TRAINING PROGRAM

## 2024-12-30 PROCEDURE — 36415 COLL VENOUS BLD VENIPUNCTURE: CPT | Performed by: STUDENT IN AN ORGANIZED HEALTH CARE EDUCATION/TRAINING PROGRAM

## 2025-01-13 NOTE — PROGRESS NOTES
Endocrinology Follow-Up  01/14/2025    The patient's last visit with Dr Verduzco was on 4/5/2022.     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    History of Present Illness  Michael Santiago  Is a 54 y.o. man who is s/p TSR 6/30/14 with Dr. Villela for 1.7 cm macroadenoma compressing optic chiasm and abutting right cavernous sinus.  preop eval indicated non-functional adenoma, path report w/o staining reports only adenoma.  He had some residual tumor in the right side of the sella, found to have growth in 8/2020 so he was treated w/ GKRS in 10/2020.     Initial presentation: to ent with dizziness, ha and neck pain   Found to have a macroadenoma - nonfunctional    Interval hx:  No new symptoms since last visit, doing well overall  Compliant with Androgel and levothyroxine.    He denies symptoms of adrenal insufficiency (lightheadedness, N/V/abd pain, hypotension).   No unexplained wt loss     No HA or vision change (follows with eye doctor for glaucoma every 6 months)    Has seen urology for ED -- tried viagra, did not work. Now on Cialis, also not really working well -- does not take it often. Reports normal libido but incomplete erections.  No significant improvement since starting androgel     Following with Dr. Villela (neurosurgery).    Imaging:  MRI Pituitary 5/16/2023  There is continued heterogeneous T2 hyperintensity and hypoenhancement within the right adenohypophysis this measures approximately 1.0 cm TV and and 0.8 cm craniocaudal previously measuring 1.2 x 0.8 cm.  No evidence for new lesion or increased size lesion.  No mass effect on the suprasellar neurovascular structures.      Regarding secondary Hypothyroidism:  On 88 mcg of levothyroxine daily (increased from 75 mcg daily 7/3/23).    Takes first thing in the morning 30 minutes prior to meal    Lab Results   Component Value Date    TSH 0.824 03/22/2018    FREET4 0.99 12/30/2024         Regarding hypogonadism:  On Androgel 3 pumps daily.    Lab Results   Component Value Date    TOTALTESTOST 615 02/21/2024    TOTALTESTOST 222 (L) 11/16/2023    TOTALTESTOST 200 (L) 08/30/2023         PSA, Screen (ng/mL)   Date Value   08/30/2023 1.0     ROS: see hpi    Objective:   Vitals not taken - telemedicine visit.    Constitutional:          Pleasant,  in no acute distress.   HENT:   Eyes:                          No scleral icterus.   Neurological:             normal speech  Psych:                        Normal mood and affect.            Previous pituitary labs:   Latest Reference Range & Units 07/03/23 07:40 08/30/23 08:12 09/19/23 12:07   Cortisol, 8 AM 4.30 - 22.40 ug/dL 13.70     ACTH 0 - 46 pg/mL 33     Somatomedin (IGF-I) 37 - 245 ng/mL 92     Free T4 0.71 - 1.51 ng/dL 0.93  1.14   LH 0.6 - 12.1 mIU/mL 2.3     Prolactin 3.5 - 19.4 ng/mL 11.4     Sex Hormone Binding Globulin 10 - 50 nmol/L 29     Testosterone Total 250 - 1100 ng/dL 252     Testosterone, Bioavailable 110.0 - 575.0 ng/dL 67.4 (L)     Testosterone, Free 46.0 - 224.0 pg/mL 35.8 (L)     Testosterone, Total 304 - 1227 ng/dL  200 (L)      Pituitary MRI 5/16/2023:    Sella: Pituitary gland is stable in height.  There is continued heterogeneous T2 hyperintensity and hypoenhancement within the right adenohypophysis this measures approximately 1.0 cm TV and and 0.8 cm craniocaudal previously measuring 1.2 x 0.8 cm.  No evidence for new lesion or increased size lesion.  No mass effect on the suprasellar neurovascular structures..    MR sella: Continued though slightly reduced sized heterogeneous hypoenhancing lesion in the right aspect of the sella suggestive for evolving pituitary adenoma.  No evidence for increased size lesion or mass effect on the suprasellar neurovascular structures.  Clinical correlation and continued follow-up advised.         Pituitary MRI 4/4/22      Assessment and Plan     Pituitary adenoma    Pituitary macroadenoma  (1.7 cm) ompressing optic chiasm and abutting right cavernous sinus, s/p TSR 6/30/14 and GKRS in 10/2020.   No new symptoms, follow up with neurosurgery as scheduled.    Low testosterone in male    On Androgel 3 pumps daily.  Last T level was normal 1 year ago.  Will recheck testosterone with CBC and PSA, repeat annually.      Secondary hypothyroidism    Latest fT4 at goal, continue levothyroxine 88 mcg daily.  Repeat free T4 along with TSH in 1 year.      RTC 1 year    Patient seen, examined and discussed with Dr. Nereyda Gabriel MD

## 2025-01-14 ENCOUNTER — OFFICE VISIT (OUTPATIENT)
Dept: ENDOCRINOLOGY | Facility: CLINIC | Age: 55
End: 2025-01-14
Payer: COMMERCIAL

## 2025-01-14 DIAGNOSIS — E29.1 HYPOGONADISM IN MALE: Primary | ICD-10-CM

## 2025-01-14 DIAGNOSIS — D49.7 PITUITARY TUMOR: ICD-10-CM

## 2025-01-14 DIAGNOSIS — R79.89 LOW TESTOSTERONE IN MALE: ICD-10-CM

## 2025-01-14 DIAGNOSIS — E03.8 SECONDARY HYPOTHYROIDISM: ICD-10-CM

## 2025-01-14 DIAGNOSIS — N52.01 ERECTILE DYSFUNCTION DUE TO ARTERIAL INSUFFICIENCY: ICD-10-CM

## 2025-01-14 DIAGNOSIS — D35.2 PITUITARY ADENOMA: ICD-10-CM

## 2025-01-14 RX ORDER — TADALAFIL 20 MG/1
20 TABLET ORAL DAILY
Qty: 20 TABLET | Refills: 10 | Status: SHIPPED | OUTPATIENT
Start: 2025-01-14 | End: 2026-01-14

## 2025-01-14 RX ORDER — TESTOSTERONE 20.25 MG/1.25G
3 GEL TOPICAL DAILY
Qty: 375 G | Refills: 1 | Status: SHIPPED | OUTPATIENT
Start: 2025-01-14 | End: 2025-01-17 | Stop reason: SDUPTHER

## 2025-01-14 NOTE — ASSESSMENT & PLAN NOTE
Latest fT4 at goal, continue levothyroxine 88 mcg daily.  Repeat free T4 along with TSH in 1 year.

## 2025-01-14 NOTE — ASSESSMENT & PLAN NOTE
On Androgel 3 pumps daily.  Last T level was normal 1 year ago.  Will recheck testosterone with CBC and PSA, repeat annually.

## 2025-01-14 NOTE — ASSESSMENT & PLAN NOTE
Pituitary macroadenoma (1.7 cm) ompressing optic chiasm and abutting right cavernous sinus, s/p TSR 6/30/14 and GKRS in 10/2020.  No new symptoms, follow up with neurosurgery as scheduled.

## 2025-01-14 NOTE — PROGRESS NOTES
I have reviewed and concur with the resident's history, physical, assessment, and plan.  I supervised and interacted with the resident during the patient examination via real-time, audio/video telecommunications technology, and all questions were answered.    Clinically doing well with current doses of levothyroxine and testosterone. Due for labs. F/u yearly with neurosurgery.    Allan Melendez MD  Endocrinology Staff

## 2025-01-14 NOTE — Clinical Note
Please schedule labs - testosterone, CBC, PSA at [facility] , tomorrow 1/15 at pt's preferred time.  RTC in 1 year

## 2025-01-15 ENCOUNTER — LAB VISIT (OUTPATIENT)
Dept: LAB | Facility: HOSPITAL | Age: 55
End: 2025-01-15
Payer: COMMERCIAL

## 2025-01-15 DIAGNOSIS — E29.1 HYPOGONADISM IN MALE: ICD-10-CM

## 2025-01-15 LAB
BASOPHILS # BLD AUTO: 0.01 K/UL (ref 0–0.2)
BASOPHILS NFR BLD: 0.3 % (ref 0–1.9)
COMPLEXED PSA SERPL-MCNC: 0.83 NG/ML (ref 0–4)
DIFFERENTIAL METHOD BLD: ABNORMAL
EOSINOPHIL # BLD AUTO: 0.1 K/UL (ref 0–0.5)
EOSINOPHIL NFR BLD: 1.8 % (ref 0–8)
ERYTHROCYTE [DISTWIDTH] IN BLOOD BY AUTOMATED COUNT: 12.5 % (ref 11.5–14.5)
HCT VFR BLD AUTO: 46.2 % (ref 40–54)
HGB BLD-MCNC: 15.5 G/DL (ref 14–18)
IMM GRANULOCYTES # BLD AUTO: 0.01 K/UL (ref 0–0.04)
IMM GRANULOCYTES NFR BLD AUTO: 0.3 % (ref 0–0.5)
LYMPHOCYTES # BLD AUTO: 1.7 K/UL (ref 1–4.8)
LYMPHOCYTES NFR BLD: 44.1 % (ref 18–48)
MCH RBC QN AUTO: 29.1 PG (ref 27–31)
MCHC RBC AUTO-ENTMCNC: 33.5 G/DL (ref 32–36)
MCV RBC AUTO: 87 FL (ref 82–98)
MONOCYTES # BLD AUTO: 0.3 K/UL (ref 0.3–1)
MONOCYTES NFR BLD: 8.5 % (ref 4–15)
NEUTROPHILS # BLD AUTO: 1.8 K/UL (ref 1.8–7.7)
NEUTROPHILS NFR BLD: 45 % (ref 38–73)
NRBC BLD-RTO: 0 /100 WBC
PLATELET # BLD AUTO: 191 K/UL (ref 150–450)
PMV BLD AUTO: 11 FL (ref 9.2–12.9)
RBC # BLD AUTO: 5.33 M/UL (ref 4.6–6.2)
TESTOST SERPL-MCNC: 270 NG/DL (ref 304–1227)
WBC # BLD AUTO: 3.88 K/UL (ref 3.9–12.7)

## 2025-01-15 PROCEDURE — 85025 COMPLETE CBC W/AUTO DIFF WBC: CPT

## 2025-01-15 PROCEDURE — 84153 ASSAY OF PSA TOTAL: CPT

## 2025-01-15 PROCEDURE — 36415 COLL VENOUS BLD VENIPUNCTURE: CPT

## 2025-01-15 PROCEDURE — 84403 ASSAY OF TOTAL TESTOSTERONE: CPT

## 2025-01-17 ENCOUNTER — PATIENT MESSAGE (OUTPATIENT)
Dept: ENDOCRINOLOGY | Facility: CLINIC | Age: 55
End: 2025-01-17
Payer: COMMERCIAL

## 2025-01-17 DIAGNOSIS — D49.7 PITUITARY TUMOR: ICD-10-CM

## 2025-01-17 DIAGNOSIS — R79.89 LOW TESTOSTERONE IN MALE: ICD-10-CM

## 2025-01-17 RX ORDER — TESTOSTERONE 20.25 MG/1.25G
4 GEL TOPICAL DAILY
Qty: 375 G | Refills: 1 | Status: SHIPPED | OUTPATIENT
Start: 2025-01-17

## 2025-04-15 DIAGNOSIS — E03.8 SECONDARY HYPOTHYROIDISM: ICD-10-CM

## 2025-04-15 RX ORDER — LEVOTHYROXINE SODIUM 88 UG/1
88 TABLET ORAL
Qty: 90 TABLET | Refills: 3 | Status: SHIPPED | OUTPATIENT
Start: 2025-04-15

## 2025-04-29 ENCOUNTER — LAB VISIT (OUTPATIENT)
Dept: LAB | Facility: HOSPITAL | Age: 55
End: 2025-04-29
Payer: COMMERCIAL

## 2025-04-29 DIAGNOSIS — R79.89 LOW TESTOSTERONE IN MALE: ICD-10-CM

## 2025-04-29 PROCEDURE — 84403 ASSAY OF TOTAL TESTOSTERONE: CPT

## 2025-04-29 PROCEDURE — 36415 COLL VENOUS BLD VENIPUNCTURE: CPT

## 2025-04-30 LAB — TESTOST SERPL-MCNC: 162 NG/DL (ref 304–1227)

## 2025-05-07 ENCOUNTER — PATIENT MESSAGE (OUTPATIENT)
Dept: ENDOCRINOLOGY | Facility: CLINIC | Age: 55
End: 2025-05-07
Payer: COMMERCIAL

## 2025-07-23 ENCOUNTER — PATIENT MESSAGE (OUTPATIENT)
Dept: ENDOCRINOLOGY | Facility: CLINIC | Age: 55
End: 2025-07-23
Payer: COMMERCIAL

## 2025-07-23 DIAGNOSIS — R79.89 LOW TESTOSTERONE IN MALE: ICD-10-CM

## 2025-07-23 DIAGNOSIS — D49.7 PITUITARY TUMOR: ICD-10-CM

## 2025-07-23 RX ORDER — TESTOSTERONE 20.25 MG/1.25G
4 GEL TOPICAL DAILY
Qty: 450 G | Refills: 2 | Status: SHIPPED | OUTPATIENT
Start: 2025-07-23